# Patient Record
Sex: MALE | Race: WHITE | NOT HISPANIC OR LATINO | Employment: UNEMPLOYED | URBAN - METROPOLITAN AREA
[De-identification: names, ages, dates, MRNs, and addresses within clinical notes are randomized per-mention and may not be internally consistent; named-entity substitution may affect disease eponyms.]

---

## 2017-03-27 ENCOUNTER — OFFICE VISIT (OUTPATIENT)
Dept: FAMILY MEDICINE CLINIC | Facility: CLINIC | Age: 27
End: 2017-03-27

## 2017-03-27 VITALS
BODY MASS INDEX: 36.43 KG/M2 | RESPIRATION RATE: 18 BRPM | HEART RATE: 76 BPM | WEIGHT: 246 LBS | DIASTOLIC BLOOD PRESSURE: 82 MMHG | HEIGHT: 69 IN | SYSTOLIC BLOOD PRESSURE: 128 MMHG | TEMPERATURE: 97 F

## 2017-03-27 DIAGNOSIS — G89.29 CHRONIC BILATERAL LOW BACK PAIN WITH BILATERAL SCIATICA: ICD-10-CM

## 2017-03-27 DIAGNOSIS — Z72.0 TOBACCO ABUSE: ICD-10-CM

## 2017-03-27 DIAGNOSIS — M54.42 CHRONIC BILATERAL LOW BACK PAIN WITH BILATERAL SCIATICA: ICD-10-CM

## 2017-03-27 DIAGNOSIS — M54.41 CHRONIC BILATERAL LOW BACK PAIN WITH BILATERAL SCIATICA: ICD-10-CM

## 2017-03-27 DIAGNOSIS — J45.20 MILD INTERMITTENT ASTHMA WITHOUT COMPLICATION: ICD-10-CM

## 2017-03-27 DIAGNOSIS — Z76.89 ENCOUNTER TO ESTABLISH CARE: Primary | ICD-10-CM

## 2017-03-27 PROCEDURE — 99407 BEHAV CHNG SMOKING > 10 MIN: CPT | Performed by: PHYSICIAN ASSISTANT

## 2017-03-27 PROCEDURE — 99203 OFFICE O/P NEW LOW 30 MIN: CPT | Performed by: PHYSICIAN ASSISTANT

## 2017-03-27 RX ORDER — ALBUTEROL SULFATE 90 UG/1
2 AEROSOL, METERED RESPIRATORY (INHALATION) EVERY 4 HOURS PRN
Qty: 1 INHALER | Refills: 0 | Status: SHIPPED | OUTPATIENT
Start: 2017-03-27 | End: 2018-06-14

## 2017-03-27 RX ORDER — MELOXICAM 15 MG/1
15 TABLET ORAL DAILY
Qty: 30 TABLET | Refills: 5 | Status: SHIPPED | OUTPATIENT
Start: 2017-03-27 | End: 2018-06-14

## 2017-03-27 RX ORDER — GABAPENTIN 300 MG/1
CAPSULE ORAL
Refills: 3 | COMMUNITY
Start: 2017-02-20 | End: 2018-06-14

## 2017-03-27 RX ORDER — IBUPROFEN 200 MG
200 TABLET ORAL EVERY 6 HOURS PRN
COMMUNITY
End: 2017-03-27

## 2017-03-27 RX ORDER — VARENICLINE TARTRATE 1 MG/1
1 TABLET, FILM COATED ORAL 2 TIMES DAILY
Qty: 60 TABLET | Refills: 2 | Status: SHIPPED | OUTPATIENT
Start: 2017-03-27 | End: 2018-06-14
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
## 2018-06-14 ENCOUNTER — OFFICE VISIT (OUTPATIENT)
Dept: FAMILY MEDICINE CLINIC | Facility: CLINIC | Age: 28
End: 2018-06-14

## 2018-06-14 VITALS
RESPIRATION RATE: 18 BRPM | DIASTOLIC BLOOD PRESSURE: 82 MMHG | HEART RATE: 76 BPM | HEIGHT: 69 IN | WEIGHT: 252.5 LBS | BODY MASS INDEX: 37.4 KG/M2 | TEMPERATURE: 99.1 F | SYSTOLIC BLOOD PRESSURE: 124 MMHG

## 2018-06-14 DIAGNOSIS — S39.92XD INJURY OF LOW BACK, SUBSEQUENT ENCOUNTER: Primary | ICD-10-CM

## 2018-06-14 DIAGNOSIS — M21.372 BILATERAL FOOT-DROP: ICD-10-CM

## 2018-06-14 DIAGNOSIS — G89.29 CHRONIC BILATERAL LOW BACK PAIN WITH BILATERAL SCIATICA: ICD-10-CM

## 2018-06-14 DIAGNOSIS — B17.10 ACUTE HEPATITIS C VIRUS INFECTION WITHOUT HEPATIC COMA: ICD-10-CM

## 2018-06-14 DIAGNOSIS — M21.371 BILATERAL FOOT-DROP: ICD-10-CM

## 2018-06-14 DIAGNOSIS — T84.84XA PAINFUL ORTHOPAEDIC HARDWARE (HCC): ICD-10-CM

## 2018-06-14 DIAGNOSIS — Z13.220 SCREENING CHOLESTEROL LEVEL: ICD-10-CM

## 2018-06-14 DIAGNOSIS — S32.009K LUMBAR PSEUDOARTHROSIS: ICD-10-CM

## 2018-06-14 DIAGNOSIS — M54.41 CHRONIC BILATERAL LOW BACK PAIN WITH BILATERAL SCIATICA: ICD-10-CM

## 2018-06-14 DIAGNOSIS — M54.42 CHRONIC BILATERAL LOW BACK PAIN WITH BILATERAL SCIATICA: ICD-10-CM

## 2018-06-14 PROBLEM — S39.92XA LOWER BACK INJURY: Status: ACTIVE | Noted: 2018-06-14

## 2018-06-14 PROCEDURE — 99214 OFFICE O/P EST MOD 30 MIN: CPT | Performed by: PHYSICIAN ASSISTANT

## 2018-06-14 RX ORDER — OXYCODONE HYDROCHLORIDE AND ACETAMINOPHEN 5; 325 MG/1; MG/1
1 TABLET ORAL EVERY 4 HOURS PRN
Qty: 18 TABLET | Refills: 0 | Status: SHIPPED | OUTPATIENT
Start: 2018-06-14 | End: 2018-06-25 | Stop reason: SDUPTHER

## 2018-06-14 RX ORDER — CYCLOBENZAPRINE HCL 10 MG
10 TABLET ORAL 3 TIMES DAILY PRN
Qty: 90 TABLET | Refills: 1 | Status: SHIPPED | OUTPATIENT
Start: 2018-06-14 | End: 2018-10-17 | Stop reason: HOSPADM
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
## 2018-06-18 LAB
ALBUMIN SERPL-MCNC: 4.64 G/DL (ref 3.2–4.8)
ALBUMIN/GLOB SERPL: 1.6 G/DL (ref 1.5–2.5)
ALP SERPL-CCNC: 101 U/L (ref 25–100)
ALT SERPL-CCNC: 52 U/L (ref 7–40)
AST SERPL-CCNC: 35 U/L (ref 0–33)
BASOPHILS # BLD AUTO: 0.02 10*3/MM3 (ref 0–0.2)
BASOPHILS NFR BLD AUTO: 0.3 % (ref 0–1)
BILIRUB SERPL-MCNC: 0.7 MG/DL (ref 0.3–1.2)
BUN SERPL-MCNC: 16 MG/DL (ref 9–23)
BUN/CREAT SERPL: 22.5 (ref 7–25)
CALCIUM SERPL-MCNC: 9.6 MG/DL (ref 8.7–10.4)
CHLORIDE SERPL-SCNC: 103 MMOL/L (ref 99–109)
CHOLEST SERPL-MCNC: 184 MG/DL (ref 0–200)
CO2 SERPL-SCNC: 28 MMOL/L (ref 20–31)
CREAT SERPL-MCNC: 0.71 MG/DL (ref 0.6–1.3)
EOSINOPHIL # BLD AUTO: 0.33 10*3/MM3 (ref 0–0.3)
EOSINOPHIL NFR BLD AUTO: 5.1 % (ref 0–3)
ERYTHROCYTE [DISTWIDTH] IN BLOOD BY AUTOMATED COUNT: 14.4 % (ref 11.3–14.5)
GFR SERPLBLD CREATININE-BSD FMLA CKD-EPI: 131 ML/MIN/1.73
GFR SERPLBLD CREATININE-BSD FMLA CKD-EPI: >150 ML/MIN/1.73
GLOBULIN SER CALC-MCNC: 3 GM/DL
GLUCOSE SERPL-MCNC: 88 MG/DL (ref 70–100)
HCT VFR BLD AUTO: 43.4 % (ref 38.9–50.9)
HCV RNA SERPL NAA+PROBE-ACNC: 560 IU/ML
HCV RNA SERPL NAA+PROBE-LOG IU: 2.75 LOG10 IU/ML
HDLC SERPL-MCNC: 53 MG/DL (ref 40–60)
HGB BLD-MCNC: 14.3 G/DL (ref 13.1–17.5)
HIV 1+2 AB+HIV1 P24 AG SERPL QL IA: NON REACTIVE
IMM GRANULOCYTES # BLD: 0.01 10*3/MM3 (ref 0–0.03)
IMM GRANULOCYTES NFR BLD: 0.2 % (ref 0–0.6)
LDLC SERPL CALC-MCNC: 117 MG/DL (ref 0–100)
LYMPHOCYTES # BLD AUTO: 1.59 10*3/MM3 (ref 0.6–4.8)
LYMPHOCYTES NFR BLD AUTO: 24.4 % (ref 24–44)
MCH RBC QN AUTO: 29.1 PG (ref 27–31)
MCHC RBC AUTO-ENTMCNC: 32.9 G/DL (ref 32–36)
MCV RBC AUTO: 88.2 FL (ref 80–99)
MONOCYTES # BLD AUTO: 0.32 10*3/MM3 (ref 0–1)
MONOCYTES NFR BLD AUTO: 4.9 % (ref 0–12)
NEUTROPHILS # BLD AUTO: 4.25 10*3/MM3 (ref 1.5–8.3)
NEUTROPHILS NFR BLD AUTO: 65.1 % (ref 41–71)
PLATELET # BLD AUTO: 218 10*3/MM3 (ref 150–450)
POTASSIUM SERPL-SCNC: 4.9 MMOL/L (ref 3.5–5.5)
PROT SERPL-MCNC: 7.6 G/DL (ref 5.7–8.2)
RBC # BLD AUTO: 4.92 10*6/MM3 (ref 4.2–5.76)
SODIUM SERPL-SCNC: 141 MMOL/L (ref 132–146)
TEST INFORMATION: NORMAL
TRIGL SERPL-MCNC: 71 MG/DL (ref 0–150)
VLDLC SERPL CALC-MCNC: 14.2 MG/DL
WBC # BLD AUTO: 6.52 10*3/MM3 (ref 3.5–10.8)

## 2018-06-25 ENCOUNTER — OFFICE VISIT (OUTPATIENT)
Dept: FAMILY MEDICINE CLINIC | Facility: CLINIC | Age: 28
End: 2018-06-25

## 2018-06-25 VITALS
BODY MASS INDEX: 36.29 KG/M2 | HEART RATE: 76 BPM | TEMPERATURE: 97.6 F | SYSTOLIC BLOOD PRESSURE: 132 MMHG | RESPIRATION RATE: 18 BRPM | DIASTOLIC BLOOD PRESSURE: 88 MMHG | HEIGHT: 69 IN | WEIGHT: 245 LBS

## 2018-06-25 DIAGNOSIS — G89.29 CHRONIC BILATERAL LOW BACK PAIN WITH BILATERAL SCIATICA: ICD-10-CM

## 2018-06-25 DIAGNOSIS — T84.84XA PAINFUL ORTHOPAEDIC HARDWARE (HCC): Primary | ICD-10-CM

## 2018-06-25 DIAGNOSIS — M54.42 CHRONIC BILATERAL LOW BACK PAIN WITH BILATERAL SCIATICA: ICD-10-CM

## 2018-06-25 DIAGNOSIS — M54.41 CHRONIC BILATERAL LOW BACK PAIN WITH BILATERAL SCIATICA: ICD-10-CM

## 2018-06-25 DIAGNOSIS — S39.92XD INJURY OF LOW BACK, SUBSEQUENT ENCOUNTER: ICD-10-CM

## 2018-06-25 PROCEDURE — 99213 OFFICE O/P EST LOW 20 MIN: CPT | Performed by: PHYSICIAN ASSISTANT

## 2018-06-25 RX ORDER — OXYCODONE HYDROCHLORIDE AND ACETAMINOPHEN 5; 325 MG/1; MG/1
1 TABLET ORAL EVERY 4 HOURS PRN
Qty: 18 TABLET | Refills: 0 | Status: SHIPPED | OUTPATIENT
Start: 2018-06-25 | End: 2018-07-10 | Stop reason: SDUPTHER

## 2018-06-25 NOTE — PROGRESS NOTES
Subjective   Sagar Iniguez is a 28 y.o. male.     History of Present Illness   F/U low back pain. Has been to see ortho at . Running a lot of tests now. Anticipating extensive back surgery. In a lot of pain now. Took last dose of pain medication today. This has really helped. Unfortunately ortho not able to give pain medication until after surgery, which is still unscheduled at this time pending tests. Would like a pain management referral. Pt is anxious to get surgery and rehab completed.     The following portions of the patient's history were reviewed and updated as appropriate: allergies, current medications, past family history, past medical history, past social history, past surgical history and problem list.    Review of Systems   Constitutional: Negative.  Negative for chills, diaphoresis, fatigue and fever.   HENT: Negative.  Negative for congestion, ear discharge, ear pain, hearing loss, nosebleeds, postnasal drip, sinus pressure, sneezing and sore throat.    Eyes: Negative.    Respiratory: Negative.  Negative for cough, chest tightness, shortness of breath and wheezing.    Cardiovascular: Negative.  Negative for chest pain, palpitations and leg swelling.   Gastrointestinal: Negative for abdominal distention, abdominal pain, anal bleeding, blood in stool, constipation, diarrhea, nausea, rectal pain and vomiting.   Genitourinary: Negative.  Negative for difficulty urinating, dysuria, flank pain, frequency, hematuria and urgency.   Musculoskeletal: Positive for back pain and gait problem. Negative for arthralgias, joint swelling, myalgias, neck pain and neck stiffness.   Skin: Negative.  Negative for color change, pallor, rash and wound.   Allergic/Immunologic: Negative.  Negative for immunocompromised state.   Neurological: Negative for dizziness, syncope, weakness, light-headedness, numbness and headaches.   Hematological: Negative.  Negative for adenopathy. Does not bruise/bleed easily.       Objective   "  Blood pressure 132/88, pulse 76, temperature 97.6 °F (36.4 °C), resp. rate 18, height 175.3 cm (69\"), weight 111 kg (245 lb).     Physical Exam   Constitutional: He is oriented to person, place, and time. He appears well-developed and well-nourished.   HENT:   Head: Normocephalic and atraumatic.   Right Ear: External ear normal.   Left Ear: External ear normal.   Nose: Nose normal.   Mouth/Throat: Oropharynx is clear and moist. No oropharyngeal exudate.   Eyes: Conjunctivae and EOM are normal. Pupils are equal, round, and reactive to light.   Neck: Normal range of motion. Neck supple. No tracheal deviation present. No thyromegaly present.   Cardiovascular: Normal rate, regular rhythm, normal heart sounds and intact distal pulses.  Exam reveals no gallop and no friction rub.    No murmur heard.  Pulmonary/Chest: Effort normal and breath sounds normal. No respiratory distress. He has no wheezes. He has no rales. He exhibits no tenderness.   Musculoskeletal:   Ambulating with cane, antalgic gait    Lymphadenopathy:     He has no cervical adenopathy.   Neurological: He is alert and oriented to person, place, and time. He has normal reflexes.   Skin: Skin is warm and dry.   Psychiatric: He has a normal mood and affect. His behavior is normal. Judgment and thought content normal.   Nursing note and vitals reviewed.      Assessment/Plan   Sagar was seen today for referral to pain management.    Diagnoses and all orders for this visit:    Painful orthopaedic hardware  -     Ambulatory Referral to Pain Management  -     oxyCODONE-acetaminophen (PERCOCET) 5-325 MG per tablet; Take 1 tablet by mouth Every 4 (Four) Hours As Needed for Severe Pain .    Injury of low back, subsequent encounter  -     Ambulatory Referral to Pain Management  -     oxyCODONE-acetaminophen (PERCOCET) 5-325 MG per tablet; Take 1 tablet by mouth Every 4 (Four) Hours As Needed for Severe Pain .    Chronic bilateral low back pain with bilateral " sciatica  -     Ambulatory Referral to Pain Management    will refer to pain management. Pt aware of agreement he will need to sign. Temporary pain management today. Will keep close f.u pending ortho evaluation of back and impending surgery

## 2018-07-09 ENCOUNTER — TELEPHONE (OUTPATIENT)
Dept: FAMILY MEDICINE CLINIC | Facility: CLINIC | Age: 28
End: 2018-07-09

## 2018-07-09 DIAGNOSIS — S39.92XD INJURY OF LOW BACK, SUBSEQUENT ENCOUNTER: ICD-10-CM

## 2018-07-09 DIAGNOSIS — T84.84XA PAINFUL ORTHOPAEDIC HARDWARE (HCC): ICD-10-CM

## 2018-07-10 ENCOUNTER — LAB (OUTPATIENT)
Dept: LAB | Facility: HOSPITAL | Age: 28
End: 2018-07-10

## 2018-07-10 ENCOUNTER — OFFICE VISIT (OUTPATIENT)
Dept: GASTROENTEROLOGY | Facility: CLINIC | Age: 28
End: 2018-07-10

## 2018-07-10 VITALS
OXYGEN SATURATION: 98 % | BODY MASS INDEX: 37.77 KG/M2 | TEMPERATURE: 97.7 F | WEIGHT: 255 LBS | SYSTOLIC BLOOD PRESSURE: 168 MMHG | HEIGHT: 69 IN | HEART RATE: 65 BPM | DIASTOLIC BLOOD PRESSURE: 98 MMHG

## 2018-07-10 DIAGNOSIS — B18.2 HEP C W/O COMA, CHRONIC (HCC): Primary | ICD-10-CM

## 2018-07-10 DIAGNOSIS — B18.2 HEP C W/O COMA, CHRONIC (HCC): ICD-10-CM

## 2018-07-10 LAB
HAV IGM SERPL QL IA: NORMAL
HBV SURFACE AB SER RIA-ACNC: REACTIVE
HBV SURFACE AG SERPL QL IA: NORMAL

## 2018-07-10 PROCEDURE — 99244 OFF/OP CNSLTJ NEW/EST MOD 40: CPT | Performed by: NURSE PRACTITIONER

## 2018-07-10 PROCEDURE — 86709 HEPATITIS A IGM ANTIBODY: CPT

## 2018-07-10 PROCEDURE — 87902 NFCT AGT GNTYP ALYS HEP C: CPT

## 2018-07-10 PROCEDURE — 87340 HEPATITIS B SURFACE AG IA: CPT

## 2018-07-10 PROCEDURE — 86706 HEP B SURFACE ANTIBODY: CPT

## 2018-07-10 PROCEDURE — 86708 HEPATITIS A ANTIBODY: CPT

## 2018-07-10 PROCEDURE — 86704 HEP B CORE ANTIBODY TOTAL: CPT

## 2018-07-10 PROCEDURE — 36415 COLL VENOUS BLD VENIPUNCTURE: CPT

## 2018-07-10 RX ORDER — OXYCODONE HYDROCHLORIDE AND ACETAMINOPHEN 5; 325 MG/1; MG/1
1 TABLET ORAL EVERY 4 HOURS PRN
Qty: 18 TABLET | Refills: 0 | Status: SHIPPED | OUTPATIENT
Start: 2018-07-10 | End: 2018-08-10 | Stop reason: HOSPADM
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
## 2018-07-10 NOTE — PATIENT INSTRUCTIONS
Hepatitis C  Hepatitis C is a viral infection of the liver. It can lead to scarring of the liver (cirrhosis), liver failure, or liver cancer. Hepatitis C may go undetected for months or years because people with the infection may not have symptoms, or they may have only mild symptoms.  What are the causes?  This condition is caused by the hepatitis C virus (HCV). The virus can spread from person to person (is contagious) through:  · Blood.  · Childbirth. A woman who has hepatitis C can pass it to her baby during birth.  · Bodily fluids, such as breast milk, tears, semen, vaginal fluids, and saliva.  · Blood transfusions or organ transplants done in the [place] before 1992.    What increases the risk?  The following factors may make you more likely to develop this condition:  · Having contact with unclean (contaminated) needles or syringes. This may result from:  ? Acupuncture.  ? Tattoing.  ? Body piercing.  ? Injecting drugs.  · Having unprotected sex with someone who is infected.  · Needing treatment to filter your blood (kidney dialysis).  · Having HIV (human immunodeficiency virus) or AIDS (acquired immunodeficiency syndrome).  · Working in a job that involves contact with blood or bodily fluids, such as health care.    What are the signs or symptoms?  Symptoms of this condition include:  · Fatigue.  · Loss of appetite.  · Nausea.  · Vomiting.  · Abdominal pain.  · Dark yellow urine.  · Yellowish skin and eyes (jaundice).  · Itchy skin.  · Hayden-colored bowel movements.  · Joint pain.  · Bleeding and bruising easily.  · Fluid building up in your stomach (ascites).    In some cases, you may not have any symptoms.  How is this diagnosed?  This condition is diagnosed with:  · Blood tests.  · Other tests to check how well your liver is functioning. They may include:  ? Magnetic resonance elastography (MRE). This imaging test uses MRIs and sound waves to measure liver stiffness.  ? Transient elastography. This  imaging test uses ultrasounds to measure liver stiffness.  ? Liver biopsy. This test requires taking a small tissue sample from your liver to examine it under a microscope.    How is this treated?  Your health care provider may perform noninvasive tests or a liver biopsy to help decide the best course of treatment. Treatment may include:  · Antiviral medicines and other medicines.  · Follow-up treatments every 6-12 months for infections or other liver conditions.  · Receiving a donated liver (liver transplant).    Follow these instructions at home:  Medicines  · Take over-the-counter and prescription medicines only as told by your health care provider.  · Take your antiviral medicine as told by your health care provider. Do not stop taking the antiviral even if you start to feel better.  · Do not take any medicines unless approved by your health care provider, including over-the-counter medicines and birth control pills.  Activity  · Rest as needed.  · Do not have sex unless approved by your health care provider.  · Ask your health care provider when you may return to school or work.  Eating and drinking  · Eat a balanced diet with plenty of fruits and vegetables, whole grains, and lowfat (lean) meats or non-meat proteins (such as beans or tofu).  · Drink enough fluids to keep your urine clear or pale yellow.  · Do not drink alcohol.  General instructions  · Do not share toothbrushes, nail clippers, or razors.  · Wash your hands frequently with soap and water. If soap and water are not available, use hand .  · Cover any cuts or open sores on your skin to prevent spreading the virus.  · Keep all follow-up visits as told by your health care provider. This is important. You may need follow-up visits every 6-12 months.  How is this prevented?  There is no vaccine for hepatitis C. The only way to prevent the disease is to reduce the risk of exposure to the virus. Make sure you:  · Wash your hands frequently with  soap and water. If soap and water are not available, use hand .  · Do not share needles or syringes.  · Practice safe sex and use condoms.  · Avoid handling blood or bodily fluids without gloves or other protection.  · Avoid getting tattoos or piercings in shops or other locations that are not clean.    Contact a health care provider if:  · You have a fever.  · You develop abdominal pain.  · You pass dark urine.  · You pass lela-colored stools.  · You develop joint pain.  Get help right away if:  · You have increasing fatigue or weakness.  · You lose your appetite.  · You cannot eat or drink without vomiting.  · You develop jaundice or your jaundice gets worse.  · You bruise or bleed easily.  Summary  · Hepatitis C is a viral infection of the liver. It can lead to scarring of the liver (cirrhosis), liver failure, or liver cancer.  · The hepatitis C virus (HCV) causes this condition. The virus can pass from person to person (is contagious).  · You should not take any medicines unless approved by your health care provider. This includes over-the-counter medicines and birth control pills.  This information is not intended to replace advice given to you by your health care provider. Make sure you discuss any questions you have with your health care provider.  Document Released: 12/15/2001 Document Revised: 01/23/2018 Document Reviewed: 01/23/2018  [employer] Patient Education © 2018 [employer]

## 2018-07-11 ENCOUNTER — OFFICE VISIT (OUTPATIENT)
Dept: FAMILY MEDICINE CLINIC | Facility: CLINIC | Age: 28
End: 2018-07-11

## 2018-07-11 VITALS
HEIGHT: 69 IN | WEIGHT: 266 LBS | DIASTOLIC BLOOD PRESSURE: 84 MMHG | SYSTOLIC BLOOD PRESSURE: 138 MMHG | BODY MASS INDEX: 39.4 KG/M2 | TEMPERATURE: 98 F | RESPIRATION RATE: 18 BRPM | HEART RATE: 84 BPM

## 2018-07-11 DIAGNOSIS — T84.84XA PAINFUL ORTHOPAEDIC HARDWARE (HCC): ICD-10-CM

## 2018-07-11 DIAGNOSIS — Z00.01 ENCOUNTER FOR WELL ADULT EXAM WITH ABNORMAL FINDINGS: Primary | ICD-10-CM

## 2018-07-11 DIAGNOSIS — S39.92XD INJURY OF LOW BACK, SUBSEQUENT ENCOUNTER: ICD-10-CM

## 2018-07-11 LAB
HAV AB SER QL IA: NEGATIVE
HBV CORE AB SER DONR QL IA: NEGATIVE

## 2018-07-11 PROCEDURE — 99395 PREV VISIT EST AGE 18-39: CPT | Performed by: PHYSICIAN ASSISTANT

## 2018-07-11 NOTE — PROGRESS NOTES
Chief Complaint   Patient presents with   • Annual Exam       Subjective     The patient is a 28 y.o. male who comes in to the office today for well exam.        Nutrition:  well balanced   Exercise:  walking, fell twice last week   Sleep:      Dentist: Not UTD   Eye Exam: no issues    Stressors: back injury     Other concerns/problems: still with chronic low back pain, made worse by recent injury and breaking of hardware     Past medical history, past surgical history, family history, social history was all reviewed and updated.    HPI  Would like to know about pain management referral     Review of Systems   Constitutional: Negative.  Negative for chills, diaphoresis, fatigue and fever.   HENT: Negative.  Negative for congestion, ear discharge, ear pain, hearing loss, nosebleeds, postnasal drip, sinus pressure, sneezing and sore throat.    Eyes: Negative.    Respiratory: Negative.  Negative for cough, chest tightness, shortness of breath and wheezing.    Cardiovascular: Negative.  Negative for chest pain, palpitations and leg swelling.   Gastrointestinal: Negative for abdominal distention, abdominal pain, anal bleeding, blood in stool, constipation, diarrhea, nausea, rectal pain and vomiting.   Endocrine: Negative.  Negative for cold intolerance, heat intolerance, polydipsia, polyphagia and polyuria.   Genitourinary: Negative.  Negative for difficulty urinating, dysuria, flank pain, frequency, hematuria and urgency.   Musculoskeletal: Positive for arthralgias, back pain and gait problem. Negative for joint swelling, myalgias, neck pain and neck stiffness.   Skin: Negative.  Negative for color change, pallor, rash and wound.   Allergic/Immunologic: Negative.  Negative for immunocompromised state.   Neurological: Negative for dizziness, syncope, weakness, light-headedness, numbness and headaches.   Hematological: Negative.  Negative for adenopathy. Does not bruise/bleed easily.   Psychiatric/Behavioral: Negative.   "Negative for behavioral problems, confusion, self-injury, sleep disturbance and suicidal ideas. The patient is not nervous/anxious.        Objective   /84   Pulse 84   Temp 98 °F (36.7 °C)   Resp 18   Ht 175.3 cm (69.02\")   Wt 121 kg (266 lb)   BMI 39.26 kg/m²     Physical Exam   Constitutional: He is oriented to person, place, and time. He appears well-developed and well-nourished.   HENT:   Head: Normocephalic and atraumatic.   Right Ear: External ear normal.   Left Ear: External ear normal.   Nose: Nose normal.   Mouth/Throat: Oropharynx is clear and moist. No oropharyngeal exudate.   Eyes: Conjunctivae and EOM are normal. Pupils are equal, round, and reactive to light.   Neck: Normal range of motion. Neck supple. No tracheal deviation present. No thyromegaly present.   Cardiovascular: Normal rate, regular rhythm, normal heart sounds and intact distal pulses.  Exam reveals no gallop and no friction rub.    No murmur heard.  Pulmonary/Chest: Effort normal and breath sounds normal. No respiratory distress. He has no wheezes. He has no rales. He exhibits no tenderness.   Abdominal: Soft. Bowel sounds are normal. He exhibits no distension and no mass. There is no tenderness. There is no rebound and no guarding. No hernia.   Musculoskeletal:   Ambulating with cane, antalgic gait    Lymphadenopathy:     He has no cervical adenopathy.   Neurological: He is alert and oriented to person, place, and time. He has normal reflexes.   Skin: Skin is warm and dry.   Psychiatric: He has a normal mood and affect. His behavior is normal. Judgment and thought content normal.   Nursing note and vitals reviewed.      Assessment/Plan     1. Encounter for well adult exam with abnormal findings    2. Injury of low back, subsequent encounter    3. Painful orthopaedic hardware (CMS/HCC)      NIKKI Parish  "

## 2018-07-13 LAB
HCV GENTYP SERPL NAA+PROBE: 3
Lab: NORMAL

## 2018-07-18 ENCOUNTER — APPOINTMENT (OUTPATIENT)
Dept: ULTRASOUND IMAGING | Facility: HOSPITAL | Age: 28
End: 2018-07-18

## 2018-08-01 ENCOUNTER — HOSPITAL ENCOUNTER (OUTPATIENT)
Dept: ULTRASOUND IMAGING | Facility: HOSPITAL | Age: 28
Discharge: HOME | End: 2018-08-01
Admitting: NURSE PRACTITIONER

## 2018-08-01 DIAGNOSIS — B18.2 HEP C W/O COMA, CHRONIC (HCC): ICD-10-CM

## 2018-08-01 PROCEDURE — 76705 ECHO EXAM OF ABDOMEN: CPT

## 2018-08-10 ENCOUNTER — LAB (OUTPATIENT)
Dept: LAB | Facility: HOSPITAL | Age: 28
End: 2018-08-10

## 2018-08-10 ENCOUNTER — OFFICE VISIT (OUTPATIENT)
Dept: GASTROENTEROLOGY | Facility: CLINIC | Age: 28
End: 2018-08-10

## 2018-08-10 VITALS
HEIGHT: 69 IN | TEMPERATURE: 98.2 F | BODY MASS INDEX: 40.46 KG/M2 | WEIGHT: 273.2 LBS | OXYGEN SATURATION: 96 % | HEART RATE: 95 BPM | DIASTOLIC BLOOD PRESSURE: 80 MMHG | SYSTOLIC BLOOD PRESSURE: 122 MMHG

## 2018-08-10 DIAGNOSIS — E66.9 OBESITY, CLASS II, BMI 35-39.9: ICD-10-CM

## 2018-08-10 DIAGNOSIS — B18.2 HEP C W/O COMA, CHRONIC (HCC): ICD-10-CM

## 2018-08-10 DIAGNOSIS — R74.8 LIVER ENZYME ELEVATION: ICD-10-CM

## 2018-08-10 DIAGNOSIS — Z23 NEED FOR VACCINATION AGAINST HEPATITIS A: ICD-10-CM

## 2018-08-10 DIAGNOSIS — B18.2 HEP C W/O COMA, CHRONIC (HCC): Primary | ICD-10-CM

## 2018-08-10 LAB
AMPHET+METHAMPHET UR QL: NEGATIVE
AMPHETAMINES UR QL: NEGATIVE
BARBITURATES UR QL SCN: NEGATIVE
BENZODIAZ UR QL SCN: NEGATIVE
BUPRENORPHINE SERPL-MCNC: NEGATIVE NG/ML
CANNABINOIDS SERPL QL: NEGATIVE
COCAINE UR QL: NEGATIVE
METHADONE UR QL SCN: NEGATIVE
OPIATES UR QL: POSITIVE
OXYCODONE UR QL SCN: NEGATIVE
PCP UR QL SCN: NEGATIVE
PROPOXYPH UR QL: NEGATIVE
TRICYCLICS UR QL SCN: NEGATIVE

## 2018-08-10 PROCEDURE — 80306 DRUG TEST PRSMV INSTRMNT: CPT

## 2018-08-10 PROCEDURE — 99213 OFFICE O/P EST LOW 20 MIN: CPT | Performed by: NURSE PRACTITIONER

## 2018-08-10 PROCEDURE — 90471 IMMUNIZATION ADMIN: CPT | Performed by: NURSE PRACTITIONER

## 2018-08-10 PROCEDURE — 90632 HEPA VACCINE ADULT IM: CPT | Performed by: NURSE PRACTITIONER

## 2018-08-10 RX ORDER — MORPHINE SULFATE AND NALTREXONE HYDROCHLORIDE 30; 1.2 MG/1; MG/1
1.2 CAPSULE, EXTENDED RELEASE ORAL ONCE
Refills: 0 | COMMUNITY
Start: 2018-08-08 | End: 2018-10-17 | Stop reason: HOSPADM

## 2018-08-10 NOTE — PROGRESS NOTES
GASTROENTEROLOGY OUTPATIENT ESTABLISHED PATIENT NOTE  Patient: ILSA VERAS : 1990  Date of Service: 08/10/2018  CC: Follow-up (1 month; ultersound results)    Assessment/Plan                                             ASSESSMENT & PLANS     Hep C w/o coma, chronic. Genotype 3.  No Fibrosis. Metavir score of F0   Liver enzyme elevation r/t problem # 1  -     Urine Drug Screen - Urine, Clean Catch; Future  · Pending insurance approval, start Mavyret (Glecaprevir 100 mg and pibrentasvir 40 mg) tab.  Take 3 tabs once daily with food x 8 wks.  Other choices for Hep C genotype 3: Epclusa, Daklinza w/ Sovaldi x 12 wks    Need for vaccination against hepatitis A  -     Hepatitis A Vaccine Adult IM. first dose today    RTC before starting Hep C meds        DISCUSSION: I explained to patient that pt has no liver fibrosis at this, which is very good for pt. However, insurance companies often decline paying for hepatitis C treatment of patients with no or minimal fibrosis.  I encouraged patient to continues remain free of any recreational drug.   I encouraged pt to stay healthy, be active, stop smoking (if applicable), avoid doing anything that can worsen his liver condition, such as alcohol, any hepatotoxic medications (i.e Tylenol). I told patient that we will reassess in 6 months to see if there is any worsening fibrosis. Patient is instructed to notify me if patient develops jaundice, profound fatigue, weight gain, altered mental status, frequent bruising and/or bleeding.The above plan was delineated in details with patient and all questions and concerns were answered.  Patient is also given contact information.  Patient is to return as scheduled or sooner if new problems arise.   Subjective                                                     SUBJECTIVE   History of Present Illness  Mr. Ilsa Veras is a 28 y.o. male is here for Follow-up on recent evaluation for hepatitis C presumeably from a haircut while  incarcerated.   Pt denies any abdominal pain, including RUQ abdominal pain.  Pt denies jaundice, pruritus, stool or urine color changes, palmar erythema, or any skin changes. No stigmata of liver disease.  History of lumbar fracture in 2009 status post back surgeries.  Continues to walk with a cane. Takes Embeda for pain. Currently seeing [facility]    ROS:Review of Systems   Constitutional: Negative.    Eyes: Negative.    Respiratory: Negative.    Cardiovascular: Negative.    Gastrointestinal: Negative.    Endocrine: Negative.    Genitourinary: Negative.    Musculoskeletal: Negative.    Skin: Negative.    Allergic/Immunologic: Negative.    Neurological: Positive for dizziness (may be medication related to the Embeda) and headaches (may be medication related to the embeda).   Hematological: Negative.    Psychiatric/Behavioral: Negative.    Subjective   PAST MED HX: Pt  has no past medical history on file.  PAST SURG HX: Pt  has no past surgical history on file.  FAM HX: family history includes Diabetes in his maternal grandmother and mother; Heart disease in his maternal grandmother; Lumbar disc disease in his mother.  SOC HX: Pt  reports that he has quit smoking. He started smoking about 12 years ago. He has a 11.00 pack-year smoking history. He has quit using smokeless tobacco. He reports that he drinks alcohol.    Objective                                                           OBJECTIVE   Allergy: Pt is allergic to hydrocodone.  MEDS: •  cyclobenzaprine (FLEXERIL) 10 MG tablet, Take 1 tablet by mouth 3 (Three) Times a Day As Needed for Muscle Spasms., Disp: 90 tablet, Rfl: 1  •  EMBEDA 30-1.2 MG capsule controlled-release, Take 1.2 mg by mouth 1 (One) Time., Disp: , Rfl: 0  Lab Results   Component Value Date    EOSABS 0.33 (H) 06/14/2018    HGB 14.3 06/14/2018    HCT 43.4 06/14/2018    MCV 88.2 06/14/2018    MCHC 32.9 06/14/2018     06/14/2018     Lab Results   Component  Value Date     06/14/2018    K 4.9 06/14/2018     06/14/2018    CO2 28.0 06/14/2018    BUN 16 06/14/2018    CREATININE 0.71 06/14/2018    CALCIUM 9.6 06/14/2018      Lab Results   Component Value Date    HEPAIGM Non-Reactive 07/10/2018    HEPBSAG Non-Reactive 07/10/2018      Hepatitis C Infection Results     HepC Genotype, Viral Load  Lab Results   Component Value Date    HCVGENOTYPE 3 07/10/2018    HEPATITISC 560 06/14/2018    REBMEY72 2.748 06/14/2018     Lab Results   Component Value Date    AST 35 (H) 06/14/2018     Lab Results   Component Value Date    ALT 52 (H) 06/14/2018     Lab Results   Component Value Date    ALKPHOS 101 (H) 06/14/2018     Lab Results   Component Value Date    BILITOT 0.7 06/14/2018    ALBUMIN 4.64 06/14/2018     HIV  Lab Results   Component Value Date    RLP5DMF0 Non Reactive 06/14/2018     HepB Surface Antigen  Lab Results   Component Value Date    HEPBSAG Non-Reactive 07/10/2018     Immunity Against Hep A and B  Lab Results   Component Value Date    HAV Negative 07/10/2018    HEPBSAB Reactive (A) 07/10/2018     Urine Drug Screen  Lab Results   Component Value Date    THCURSCR Negative 08/10/2018    PCPUR Negative 08/10/2018    COCAINEUR Negative 08/10/2018    METAMPSCNUR Negative 08/10/2018    LABOPIASCN Positive (A) 08/10/2018    AMPHETSCREEN Negative 08/10/2018    LABBENZSCN Negative 08/10/2018    TRICYCLICSCN Negative 08/10/2018    LABMETHSCN Negative 08/10/2018    BARBITSCNUR Negative 08/10/2018    OXYCODONESCN Negative 08/10/2018    PROPOXSCN Negative 08/10/2018    BUPRENORSCNU Negative 08/10/2018       EXAMINATION: US LIVER-   FINDINGS: The pancreas is homogeneous in its visualized portions with no  focal mass or abnormal fluid collection. The liver is homogeneous and  normal in echotexture and echogenicity with no evidence of focal mass or  intrahepatic biliary ductal dilatation. Gallbladder reveals no evidence  of stones, gallbladder wall thickening or  pericholecystic fluid. The  common bile duct measures 5 mm. The right kidney is normal in size,  configuration, and texture measuring in length from pole to pole 11.1  cm. There is no solid cortical mass or renal cortical cyst seen within  the right kidney. No hydronephrosis or nephrolithiasis.     Patient's BMI is 39.3. Elastography was performed with a shear wave  velocity of 1.03 m/s. This correlates to a Metavir score of  F0     IMPRESSION:  1.Elastography was performed with a shear wave velocity of 1.03 m/s.  This correlates to a Metavir score of  F0  2. Unremarkable right upper quadrant ultrasound.      Wt Readings from Last 5 Encounters:   08/10/18 124 kg (273 lb 3.2 oz)   07/11/18 121 kg (266 lb)   07/10/18 116 kg (255 lb)   06/25/18 111 kg (245 lb)   06/14/18 115 kg (252 lb 8 oz)   body mass index is 40.32 kg/m².,Temp: 98.2 °F (36.8 °C),BP: 122/80,Heart Rate: 95   Physical Exam  General Well developed; well nourished; no acute distress.   ENT Oral mucosa pink and moist without thrush or lesions.    GI Abd soft, NT, ND, normal active bowel sounds.  No HSM.  No abd hernia    Pt care team: Jacy SAMUEL & Morena Mcgill  [facility]--Gastroenterology  [phone]    CC: [place] Longoria PA   [address] [place] [address] / [place] [address] FAX:[phone]

## 2018-09-07 ENCOUNTER — TELEPHONE (OUTPATIENT)
Dept: GASTROENTEROLOGY | Facility: CLINIC | Age: 28
End: 2018-09-07

## 2018-09-07 NOTE — TELEPHONE ENCOUNTER
"Pt is counseled on side effects of Mavyret.   Pt reports that he is only Oxycodone and Vitamin D currently.  No other meds, including OTC  Reports of frequent gum bleed.  Has not seen a dentist for over 12 yrs.  Last saw dentist when he was 15 y/o  Pt is counseled on seeing a dentist prior to starting Hep C med  Pt states that he has to find a dentist who would accept his insurance. He wants to go ahead and start his HepC meds.  I explained to him risks and benefits of starting Hep C med at this time.    Pt states of planning to get \"bunches of tooth brushes and throw them out when he has a gum bleed.\" He is aware risks and benefits explained.     Pt is to RTC in 1 month for a f/u        "

## 2018-09-07 NOTE — TELEPHONE ENCOUNTER
Jacy,  Patient called back. He stated he received his HEP C medication today. He doesn't see why he has to come back in for education on how to take it. He had papers to come with medication on how to take medication.  He agreed to come back in a month. Please advise.

## 2018-10-17 ENCOUNTER — OFFICE VISIT (OUTPATIENT)
Dept: GASTROENTEROLOGY | Facility: CLINIC | Age: 28
End: 2018-10-17

## 2018-10-17 ENCOUNTER — LAB (OUTPATIENT)
Dept: LAB | Facility: HOSPITAL | Age: 28
End: 2018-10-17

## 2018-10-17 VITALS
RESPIRATION RATE: 16 BRPM | SYSTOLIC BLOOD PRESSURE: 140 MMHG | WEIGHT: 278 LBS | DIASTOLIC BLOOD PRESSURE: 100 MMHG | BODY MASS INDEX: 41.18 KG/M2 | OXYGEN SATURATION: 97 % | TEMPERATURE: 99.3 F | HEART RATE: 103 BPM | HEIGHT: 69 IN

## 2018-10-17 DIAGNOSIS — B18.2 HEP C W/O COMA, CHRONIC (HCC): ICD-10-CM

## 2018-10-17 DIAGNOSIS — B18.2 HEP C W/O COMA, CHRONIC (HCC): Primary | ICD-10-CM

## 2018-10-17 DIAGNOSIS — Z23 NEED FOR VACCINATION AGAINST HEPATITIS A: ICD-10-CM

## 2018-10-17 LAB
ALBUMIN SERPL-MCNC: 4.62 G/DL (ref 3.2–4.8)
ALBUMIN/GLOB SERPL: 1.9 G/DL (ref 1.5–2.5)
ALP SERPL-CCNC: 112 U/L (ref 25–100)
ALT SERPL W P-5'-P-CCNC: 36 U/L (ref 7–40)
ANION GAP SERPL CALCULATED.3IONS-SCNC: 7 MMOL/L (ref 3–11)
AST SERPL-CCNC: 26 U/L (ref 0–33)
BASOPHILS # BLD AUTO: 0.03 10*3/MM3 (ref 0–0.2)
BASOPHILS NFR BLD AUTO: 0.3 % (ref 0–1)
BILIRUB SERPL-MCNC: 0.7 MG/DL (ref 0.3–1.2)
BUN BLD-MCNC: 13 MG/DL (ref 9–23)
BUN/CREAT SERPL: 19.4 (ref 7–25)
CALCIUM SPEC-SCNC: 9.4 MG/DL (ref 8.7–10.4)
CHLORIDE SERPL-SCNC: 98 MMOL/L (ref 99–109)
CO2 SERPL-SCNC: 27 MMOL/L (ref 20–31)
CREAT BLD-MCNC: 0.67 MG/DL (ref 0.6–1.3)
DEPRECATED RDW RBC AUTO: 44.7 FL (ref 37–54)
EOSINOPHIL # BLD AUTO: 0.42 10*3/MM3 (ref 0–0.3)
EOSINOPHIL NFR BLD AUTO: 4.6 % (ref 0–3)
ERYTHROCYTE [DISTWIDTH] IN BLOOD BY AUTOMATED COUNT: 14.3 % (ref 11.3–14.5)
GFR SERPL CREATININE-BSD FRML MDRD: 140 ML/MIN/1.73
GLOBULIN UR ELPH-MCNC: 2.5 GM/DL
GLUCOSE BLD-MCNC: 94 MG/DL (ref 70–100)
HCT VFR BLD AUTO: 41.5 % (ref 38.9–50.9)
HGB BLD-MCNC: 14.4 G/DL (ref 13.1–17.5)
IMM GRANULOCYTES # BLD: 0.04 10*3/MM3 (ref 0–0.03)
IMM GRANULOCYTES NFR BLD: 0.4 % (ref 0–0.6)
LYMPHOCYTES # BLD AUTO: 2.63 10*3/MM3 (ref 0.6–4.8)
LYMPHOCYTES NFR BLD AUTO: 28.8 % (ref 24–44)
MCH RBC QN AUTO: 29.9 PG (ref 27–31)
MCHC RBC AUTO-ENTMCNC: 34.7 G/DL (ref 32–36)
MCV RBC AUTO: 86.3 FL (ref 80–99)
MONOCYTES # BLD AUTO: 0.49 10*3/MM3 (ref 0–1)
MONOCYTES NFR BLD AUTO: 5.4 % (ref 0–12)
NEUTROPHILS # BLD AUTO: 5.56 10*3/MM3 (ref 1.5–8.3)
NEUTROPHILS NFR BLD AUTO: 60.9 % (ref 41–71)
PLATELET # BLD AUTO: 263 10*3/MM3 (ref 150–450)
PMV BLD AUTO: 9.1 FL (ref 6–12)
POTASSIUM BLD-SCNC: 4 MMOL/L (ref 3.5–5.5)
PROT SERPL-MCNC: 7.1 G/DL (ref 5.7–8.2)
RBC # BLD AUTO: 4.81 10*6/MM3 (ref 4.2–5.76)
SODIUM BLD-SCNC: 132 MMOL/L (ref 132–146)
WBC NRBC COR # BLD: 9.13 10*3/MM3 (ref 3.5–10.8)

## 2018-10-17 PROCEDURE — 36415 COLL VENOUS BLD VENIPUNCTURE: CPT

## 2018-10-17 PROCEDURE — 99213 OFFICE O/P EST LOW 20 MIN: CPT | Performed by: NURSE PRACTITIONER

## 2018-10-17 PROCEDURE — 80053 COMPREHEN METABOLIC PANEL: CPT

## 2018-10-17 PROCEDURE — 87522 HEPATITIS C REVRS TRNSCRPJ: CPT

## 2018-10-17 PROCEDURE — 85025 COMPLETE CBC W/AUTO DIFF WBC: CPT

## 2018-10-17 RX ORDER — OXYCODONE AND ACETAMINOPHEN 10; 325 MG/1; MG/1
1 TABLET ORAL EVERY 6 HOURS PRN
COMMUNITY
End: 2019-02-01

## 2018-10-17 NOTE — PROGRESS NOTES
GASTROENTEROLOGY OUTPATIENT ESTABLISHED PATIENT NOTE  Patient: ILSA VERAS : 1990  Date of Service: 10/17/2018  CC: Hepatitis C    Assessment/Plan                                             ASSESSMENT & PLANS     Hep C w/o coma, chronic (CMS/HCC) Genotype 3.  No Fibrosis. Metavir score of F0   -     CBC Auto Differential; Future  -     Comprehensive Metabolic Panel; Future  -     Hepatitis C RNA, Quantitative, PCR (graph); Future  · Continue Mavyret to complete 8-wks course    Need for vaccination against hepatitis A  · Pt is due for last Hep A vaccine in 2019    Follow Up: RTC in 2019      DISCUSSION: The above plan was delineated in details with patient  and all questions and concerns were answered.  Patient is also given contact information.  Patient is to return as scheduled or sooner if new problems arise.   Subjective                                                     SUBJECTIVE   History of Present Illness  Mr. Ilsa Veras is a 28 y.o. male who is here for Hepatitis C. Presumably contracted Hep C from a haircut while incarcerated.  No hx of recreational drug use.   Started Mavyret 18.  Has 16 days of Mavyret remaining. Has some nausea w/ Mavyret  No longer has frequent gum bleed w/ changing toothpaste    Pt denies any abdominal pain, including RUQ abdominal pain.  Pt denies jaundice, pruritus, stool or urine color changes, palmar erythema, or any skin changes. No stigmata of liver disease.  No change in bowel habits. Pt denies dark black stools or bright red blood in the stools, in the toilet bowl, or on the toilet tissue.  No diarrhea or constipation.  Stool character and consistency have been normal.    ROS:Review of Systems   Gastrointestinal: Positive for nausea.   Psychiatric/Behavioral: Positive for sleep disturbance (Trouble falling asleep, bad dreams).   All other systems reviewed and are negative.    Objective                                                           OBJECTIVE    Allergy: Pt is allergic to hydrocodone.  MEDS:   Current Outpatient Prescriptions:   •  Glecaprevir-Pibrentasvir 100-40 MG tablet, Take 3 tablets by mouth Daily With Breakfast. Take THREE (3) tabs once daily with food, Disp: 84 tablet, Rfl: 1  •  oxyCODONE-acetaminophen (PERCOCET)  MG per tablet, Take 1 tablet by mouth Every 6 (Six) Hours As Needed for Moderate Pain ., Disp: , Rfl:   Lab Results   Component Value Date    WBCREF 6.52 06/14/2018    EOSABS 0.33 (H) 06/14/2018    HGB 14.3 06/14/2018    HCT 43.4 06/14/2018    MCV 88.2 06/14/2018    MCHC 32.9 06/14/2018     06/14/2018     Lab Results   Component Value Date     06/14/2018    K 4.9 06/14/2018     06/14/2018    CO2 28.0 06/14/2018    BUN 16 06/14/2018    CREATININE 0.71 06/14/2018    EGFRIFNONA 131 06/14/2018    CALCIUM 9.6 06/14/2018     Lab Results   Component Value Date    AST 35 (H) 06/14/2018    ALT 52 (H) 06/14/2018    ALKPHOS 101 (H) 06/14/2018    BILITOT 0.7 06/14/2018    ALBUMIN 4.64 06/14/2018     No results found for: LIPASE  No results found for: HGBA1C, TSH  No results found for: INR  Lab Results   Component Value Date    HEPAIGM Non-Reactive 07/10/2018    HEPBSAG Non-Reactive 07/10/2018     Lab Results   Component Value Date    THCURSCR Negative 08/10/2018    PCPUR Negative 08/10/2018    COCAINEUR Negative 08/10/2018    METAMPSCNUR Negative 08/10/2018    LABOPIASCN Positive (A) 08/10/2018    AMPHETSCREEN Negative 08/10/2018    LABBENZSCN Negative 08/10/2018    TRICYCLICSCN Negative 08/10/2018    LABMETHSCN Negative 08/10/2018    BARBITSCNUR Negative 08/10/2018    OXYCODONESCN Negative 08/10/2018    PROPOXSCN Negative 08/10/2018    BUPRENORSCNU Negative 08/10/2018     Wt Readings from Last 5 Encounters:   10/17/18 126 kg (278 lb)   08/10/18 124 kg (273 lb 3.2 oz)   07/11/18 121 kg (266 lb)   07/10/18 116 kg (255 lb)   06/25/18 111 kg (245 lb)   body mass index is 41.03 kg/m².,Temp: 99.3 °F (37.4 °C),BP: 140/100,Heart  Rate: 103   Physical Exam  General Well developed; well nourished; no acute distress.   ENT Oral mucosa pink and moist without thrush or lesions.    GI Abd soft, NT, ND, normal active bowel sounds.  No HSM.  No abd hernia    Pt care team: Jacy SAMUEL & Leisa Baron MA  [facility]--Gastroenterology  [phone]    CC: , [place], [place]   [address] [place] [address] / [place] [address] FAX:[phone]

## 2018-10-19 LAB
HCV RNA SERPL NAA+PROBE-ACNC: NORMAL IU/ML
TEST INFORMATION: NORMAL

## 2019-02-01 ENCOUNTER — OFFICE VISIT (OUTPATIENT)
Dept: FAMILY MEDICINE CLINIC | Facility: CLINIC | Age: 29
End: 2019-02-01

## 2019-02-01 VITALS
HEIGHT: 69 IN | SYSTOLIC BLOOD PRESSURE: 150 MMHG | WEIGHT: 277 LBS | TEMPERATURE: 98 F | HEART RATE: 88 BPM | RESPIRATION RATE: 20 BRPM | BODY MASS INDEX: 41.03 KG/M2 | DIASTOLIC BLOOD PRESSURE: 95 MMHG

## 2019-02-01 DIAGNOSIS — J01.00 ACUTE NON-RECURRENT MAXILLARY SINUSITIS: Primary | ICD-10-CM

## 2019-02-01 PROCEDURE — 99213 OFFICE O/P EST LOW 20 MIN: CPT | Performed by: NURSE PRACTITIONER

## 2019-02-01 RX ORDER — AMOXICILLIN 875 MG/1
875 TABLET, COATED ORAL 2 TIMES DAILY
Qty: 14 TABLET | Refills: 0 | Status: SHIPPED | OUTPATIENT
Start: 2019-02-01 | End: 2019-02-12

## 2019-02-01 NOTE — PROGRESS NOTES
Subjective   Sagar Iniguez is a 28 y.o. male.     History of Present Illness   Sinus congestion, facial pressure and pain, PND, yellow thick nasal secretions for the past 3 weeks, was feeling some better then started feeling worse again  No fever or chills, no cough or difficulty breathing, no dizziness or lightheadedness  Taking OTC cold med multi sx with some relief     The following portions of the patient's history were reviewed and updated as appropriate: allergies, current medications, past family history, past medical history, past social history, past surgical history and problem list.    Review of Systems   Constitutional: Negative for chills, fatigue and fever.   HENT: Positive for congestion, ear pain, postnasal drip, rhinorrhea, sinus pressure and sore throat. Negative for sneezing and trouble swallowing.    Eyes: Negative.  Negative for discharge and redness.   Respiratory: Negative.  Negative for cough.    Cardiovascular: Negative.    Gastrointestinal: Negative.    Musculoskeletal: Negative for myalgias, neck pain and neck stiffness.   Skin: Negative.    Neurological: Negative.  Negative for dizziness and light-headedness.   Hematological: Negative.    Psychiatric/Behavioral: Negative.  Negative for sleep disturbance.       Objective   Physical Exam   Constitutional: He is oriented to person, place, and time. He appears well-developed and well-nourished. No distress.   HENT:   Head: Normocephalic.   Right Ear: Tympanic membrane, external ear and ear canal normal.   Left Ear: Tympanic membrane, external ear and ear canal normal.   Nose: Mucosal edema and rhinorrhea present. Right sinus exhibits maxillary sinus tenderness and frontal sinus tenderness. Left sinus exhibits maxillary sinus tenderness and frontal sinus tenderness.   Mouth/Throat: Uvula is midline, oropharynx is clear and moist and mucous membranes are normal. No oropharyngeal exudate, posterior oropharyngeal edema, posterior oropharyngeal  erythema or tonsillar abscesses.   Eyes: Conjunctivae are normal.   Neck: Neck supple.   Cardiovascular: Normal rate, regular rhythm, S1 normal, S2 normal and normal heart sounds.   Pulmonary/Chest: Effort normal and breath sounds normal. No respiratory distress. He has no wheezes.   Lymphadenopathy:        Head (right side): No tonsillar, no preauricular, no posterior auricular and no occipital adenopathy present.        Head (left side): No tonsillar, no preauricular, no posterior auricular and no occipital adenopathy present.     He has no cervical adenopathy.   Neurological: He is alert and oriented to person, place, and time.   Skin: Skin is warm and dry. He is not diaphoretic.   Psychiatric: He has a normal mood and affect. His behavior is normal. Judgment and thought content normal.   Nursing note and vitals reviewed.        Assessment/Plan   Sagar was seen today for sinus congestion, drainage.    Diagnoses and all orders for this visit:    Acute non-recurrent maxillary sinusitis  -     amoxicillin (AMOXIL) 875 MG tablet; Take 1 tablet by mouth 2 (Two) Times a Day.      Continue OTC cold medication for symptoms  Take Amoxil until gone, follow up as needed.

## 2019-02-12 ENCOUNTER — OFFICE VISIT (OUTPATIENT)
Dept: FAMILY MEDICINE CLINIC | Facility: CLINIC | Age: 29
End: 2019-02-12

## 2019-02-12 VITALS
WEIGHT: 266 LBS | TEMPERATURE: 96.1 F | OXYGEN SATURATION: 97 % | SYSTOLIC BLOOD PRESSURE: 128 MMHG | BODY MASS INDEX: 39.26 KG/M2 | DIASTOLIC BLOOD PRESSURE: 82 MMHG | HEART RATE: 93 BPM

## 2019-02-12 DIAGNOSIS — R06.02 SOB (SHORTNESS OF BREATH): ICD-10-CM

## 2019-02-12 DIAGNOSIS — R05.9 COUGH: ICD-10-CM

## 2019-02-12 DIAGNOSIS — J20.8 ACUTE BRONCHITIS DUE TO OTHER SPECIFIED ORGANISMS: Primary | ICD-10-CM

## 2019-02-12 PROCEDURE — 99213 OFFICE O/P EST LOW 20 MIN: CPT | Performed by: PHYSICIAN ASSISTANT

## 2019-02-12 RX ORDER — FLUTICASONE PROPIONATE 50 MCG
2 SPRAY, SUSPENSION (ML) NASAL DAILY
Qty: 16 G | Refills: 0 | Status: SHIPPED | OUTPATIENT
Start: 2019-02-12 | End: 2019-07-22

## 2019-02-12 RX ORDER — ALBUTEROL SULFATE 90 UG/1
2 AEROSOL, METERED RESPIRATORY (INHALATION) EVERY 4 HOURS PRN
Qty: 1 INHALER | Refills: 0 | Status: SHIPPED | OUTPATIENT
Start: 2019-02-12 | End: 2019-07-22

## 2019-02-12 RX ORDER — PREDNISONE 10 MG/1
TABLET ORAL
Qty: 21 TABLET | Refills: 0 | Status: SHIPPED | OUTPATIENT
Start: 2019-02-12 | End: 2019-02-20

## 2019-02-12 RX ORDER — DOXYCYCLINE 100 MG/1
100 CAPSULE ORAL EVERY 12 HOURS SCHEDULED
Qty: 20 CAPSULE | Refills: 0 | Status: SHIPPED | OUTPATIENT
Start: 2019-02-12 | End: 2019-03-25

## 2019-02-12 RX ORDER — DEXTROMETHORPHAN HYDROBROMIDE AND PROMETHAZINE HYDROCHLORIDE 15; 6.25 MG/5ML; MG/5ML
5 SYRUP ORAL 4 TIMES DAILY PRN
Qty: 180 ML | Refills: 0 | Status: SHIPPED | OUTPATIENT
Start: 2019-02-12 | End: 2019-02-20 | Stop reason: SDUPTHER

## 2019-02-12 NOTE — PROGRESS NOTES
Subjective   Sagar Iniguez is a 28 y.o. male.     History of Present Illness   Pt presents with CC of cough with yellow mucus production, sinus congestion, drainage, SOB, wheezing, fatigue, subjective fever for the last several weeks. Was treated two weeks ago for sinus infection with amoxicillin, mild improvement but feels like it moved down to chest.   Pt is former smoker   Has hardware in his back and this has exacerbated back pain with all the coughing (still has not gotten surgery for broken back hardware- making him lose 30 lbs, make sure hep C is cleared, and had to correct his vitamin D def)     The following portions of the patient's history were reviewed and updated as appropriate: allergies, current medications, past family history, past medical history, past social history, past surgical history and problem list.    Review of Systems   Constitutional: Positive for diaphoresis and fatigue. Negative for chills and fever.   HENT: Positive for congestion, postnasal drip and sinus pressure. Negative for ear discharge, ear pain, hearing loss, nosebleeds, sinus pain, sneezing and sore throat.    Eyes: Negative.    Respiratory: Positive for cough, shortness of breath and wheezing. Negative for chest tightness.    Cardiovascular: Negative.  Negative for chest pain, palpitations and leg swelling.   Gastrointestinal: Negative for abdominal distention, abdominal pain, blood in stool, constipation, diarrhea, nausea and vomiting.   Genitourinary: Negative.  Negative for difficulty urinating, dysuria, flank pain, frequency, hematuria and urgency.   Musculoskeletal: Positive for back pain and gait problem. Negative for arthralgias, joint swelling, myalgias, neck pain and neck stiffness.   Skin: Negative.  Negative for color change, pallor, rash and wound.   Neurological: Negative for dizziness, syncope, weakness, light-headedness, numbness and headaches.       Objective   Physical Exam   Constitutional: He is oriented to  person, place, and time. He appears well-developed and well-nourished.   HENT:   Head: Normocephalic and atraumatic.   Right Ear: Tympanic membrane, external ear and ear canal normal.   Left Ear: Tympanic membrane, external ear and ear canal normal.   Nose: Nose normal.   Mouth/Throat: Oropharynx is clear and moist. No oropharyngeal exudate.   Eyes: Conjunctivae are normal.   Neck: Normal range of motion. Neck supple. No tracheal deviation present. No thyromegaly present.   Cardiovascular: Normal rate, regular rhythm, normal heart sounds and intact distal pulses. Exam reveals no gallop and no friction rub.   No murmur heard.  Pulmonary/Chest: Effort normal. No respiratory distress. He has wheezes. He has rhonchi. He has no rales. He exhibits no tenderness.   Lymphadenopathy:     He has no cervical adenopathy.   Neurological: He is alert and oriented to person, place, and time.   Skin: Skin is warm. He is diaphoretic.   Psychiatric: He has a normal mood and affect. His behavior is normal. Judgment and thought content normal.   Nursing note and vitals reviewed.      Assessment/Plan   Sagar was seen today for sinus problem and cough.    Diagnoses and all orders for this visit:    Acute bronchitis due to other specified organisms  -     doxycycline (MONODOX) 100 MG capsule; Take 1 capsule by mouth Every 12 (Twelve) Hours.  -     predniSONE (DELTASONE) 10 MG tablet; Take 60 mg po day 1, 50 mg po day 2, 40 mg po day 3, 30 mg po day 4, 20 mg po day 5, 10 mg po day 6  -     fluticasone (FLONASE) 50 MCG/ACT nasal spray; 2 sprays into the nostril(s) as directed by provider Daily.    SOB (shortness of breath)  -     albuterol sulfate  (90 Base) MCG/ACT inhaler; Inhale 2 puffs Every 4 (Four) Hours As Needed for Wheezing or Shortness of Air.    Cough  -     promethazine-dextromethorphan (PROMETHAZINE-DM) 6.25-15 MG/5ML syrup; Take 5 mL by mouth 4 (Four) Times a Day As Needed for Cough.    treatment as outlined in plan. F/U  INB

## 2019-02-13 ENCOUNTER — LAB (OUTPATIENT)
Dept: LAB | Facility: HOSPITAL | Age: 29
End: 2019-02-13

## 2019-02-13 ENCOUNTER — OFFICE VISIT (OUTPATIENT)
Dept: GASTROENTEROLOGY | Facility: CLINIC | Age: 29
End: 2019-02-13

## 2019-02-13 VITALS
DIASTOLIC BLOOD PRESSURE: 90 MMHG | HEIGHT: 69 IN | TEMPERATURE: 97.3 F | HEART RATE: 88 BPM | WEIGHT: 278.6 LBS | SYSTOLIC BLOOD PRESSURE: 150 MMHG | BODY MASS INDEX: 41.26 KG/M2 | OXYGEN SATURATION: 97 %

## 2019-02-13 DIAGNOSIS — B18.2 HEP C W/O COMA, CHRONIC (HCC): ICD-10-CM

## 2019-02-13 DIAGNOSIS — B18.2 HEP C W/O COMA, CHRONIC (HCC): Primary | ICD-10-CM

## 2019-02-13 DIAGNOSIS — E66.01 MORBID OBESITY WITH BMI OF 40.0-44.9, ADULT (HCC): ICD-10-CM

## 2019-02-13 DIAGNOSIS — R74.8 ALKALINE PHOSPHATASE ELEVATION: ICD-10-CM

## 2019-02-13 DIAGNOSIS — Z23 NEED FOR VACCINATION AGAINST HEPATITIS A: ICD-10-CM

## 2019-02-13 LAB
ALBUMIN SERPL-MCNC: 5.34 G/DL (ref 3.2–4.8)
ALBUMIN/GLOB SERPL: 1.9 G/DL (ref 1.5–2.5)
ALP SERPL-CCNC: 110 U/L (ref 25–100)
ALT SERPL W P-5'-P-CCNC: 34 U/L (ref 7–40)
ANION GAP SERPL CALCULATED.3IONS-SCNC: 7 MMOL/L (ref 3–11)
AST SERPL-CCNC: 20 U/L (ref 0–33)
BASOPHILS # BLD AUTO: 0.02 10*3/MM3 (ref 0–0.2)
BASOPHILS NFR BLD AUTO: 0.1 % (ref 0–1)
BILIRUB SERPL-MCNC: 1.1 MG/DL (ref 0.3–1.2)
BUN BLD-MCNC: 16 MG/DL (ref 9–23)
BUN/CREAT SERPL: 20.5 (ref 7–25)
CALCIUM SPEC-SCNC: 10 MG/DL (ref 8.7–10.4)
CHLORIDE SERPL-SCNC: 105 MMOL/L (ref 99–109)
CO2 SERPL-SCNC: 25 MMOL/L (ref 20–31)
CREAT BLD-MCNC: 0.78 MG/DL (ref 0.6–1.3)
DEPRECATED RDW RBC AUTO: 48.8 FL (ref 37–54)
EOSINOPHIL # BLD AUTO: 0.05 10*3/MM3 (ref 0–0.3)
EOSINOPHIL NFR BLD AUTO: 0.3 % (ref 0–3)
ERYTHROCYTE [DISTWIDTH] IN BLOOD BY AUTOMATED COUNT: 15.2 % (ref 11.3–14.5)
GFR SERPL CREATININE-BSD FRML MDRD: 118 ML/MIN/1.73
GGT SERPL-CCNC: 67 U/L (ref 0–72)
GLOBULIN UR ELPH-MCNC: 2.9 GM/DL
GLUCOSE BLD-MCNC: 88 MG/DL (ref 70–100)
HCT VFR BLD AUTO: 45.6 % (ref 38.9–50.9)
HGB BLD-MCNC: 15.3 G/DL (ref 13.1–17.5)
IMM GRANULOCYTES # BLD AUTO: 0.18 10*3/MM3 (ref 0–0.05)
IMM GRANULOCYTES NFR BLD AUTO: 1 % (ref 0–0.6)
LYMPHOCYTES # BLD AUTO: 1.57 10*3/MM3 (ref 0.6–4.8)
LYMPHOCYTES NFR BLD AUTO: 8.4 % (ref 24–44)
MCH RBC QN AUTO: 29.5 PG (ref 27–31)
MCHC RBC AUTO-ENTMCNC: 33.6 G/DL (ref 32–36)
MCV RBC AUTO: 87.9 FL (ref 80–99)
MONOCYTES # BLD AUTO: 0.85 10*3/MM3 (ref 0–1)
MONOCYTES NFR BLD AUTO: 4.6 % (ref 0–12)
NEUTROPHILS # BLD AUTO: 16.09 10*3/MM3 (ref 1.5–8.3)
NEUTROPHILS NFR BLD AUTO: 86.6 % (ref 41–71)
PLATELET # BLD AUTO: 286 10*3/MM3 (ref 150–450)
PMV BLD AUTO: 9.6 FL (ref 6–12)
POTASSIUM BLD-SCNC: 4.5 MMOL/L (ref 3.5–5.5)
PROT SERPL-MCNC: 8.2 G/DL (ref 5.7–8.2)
RBC # BLD AUTO: 5.19 10*6/MM3 (ref 4.2–5.76)
SODIUM BLD-SCNC: 137 MMOL/L (ref 132–146)
WBC NRBC COR # BLD: 18.58 10*3/MM3 (ref 3.5–10.8)

## 2019-02-13 PROCEDURE — 80053 COMPREHEN METABOLIC PANEL: CPT

## 2019-02-13 PROCEDURE — 87522 HEPATITIS C REVRS TRNSCRPJ: CPT

## 2019-02-13 PROCEDURE — 99213 OFFICE O/P EST LOW 20 MIN: CPT | Performed by: NURSE PRACTITIONER

## 2019-02-13 PROCEDURE — 36415 COLL VENOUS BLD VENIPUNCTURE: CPT

## 2019-02-13 PROCEDURE — 85025 COMPLETE CBC W/AUTO DIFF WBC: CPT

## 2019-02-13 PROCEDURE — 82977 ASSAY OF GGT: CPT | Performed by: NURSE PRACTITIONER

## 2019-02-13 PROCEDURE — 90471 IMMUNIZATION ADMIN: CPT | Performed by: NURSE PRACTITIONER

## 2019-02-13 PROCEDURE — 90632 HEPA VACCINE ADULT IM: CPT | Performed by: NURSE PRACTITIONER

## 2019-02-13 NOTE — PROGRESS NOTES
GASTROENTEROLOGY OUTPATIENT ESTABLISHED PATIENT NOTE  Patient: ILSA VERAS : 1990  Date of Service: 2019  CC: Follow-up    Assessment/Plan                                             ASSESSMENT & PLANS     Hep C w/o coma, chronic (CMS/HCC) Genotype 3.  No Fibrosis. Metavir score of F0. S/p Mavyret (8-wk) course Sept through Oct 2018  -     Comprehensive Metabolic Panel; Future  -     CBC Auto Differential; Future  -     Hepatitis C RNA, Quantitative, PCR (graph); Future    Need for vaccination against hepatitis A  -     Hepatitis A Vaccine Adult IM.  Last dose today    Morbid obesity with BMI of 40.0-44.9, adult (CMS/HCC)  -     Ambulatory Referral to Weight Management Program    Follow Up:Return in about 6 months (around 2019).      DISCUSSION: The above plan was delineated in details with patient and all questions and concerns were answered.  Patient is also given contact information.  Patient is to return as scheduled or sooner if new problems arise.   Subjective                                                     SUBJECTIVE   History of Present Illness  Mr. Ilsa Veras is a 28 y.o. male who is here for Follow-up  Presumably contracted Hep C from a haircut while incarcerated.  No hx of recreational drug use.   Started Mavyret 18 w/o complications.  Pt denies any abdominal pain, including RUQ abdominal pain.  Pt denies jaundice, pruritus, stool or urine color changes, palmar erythema, or any skin changes. No stigmata of liver disease.    History of lumbar fracture in  status post back surgeries.  Continues to walk with a cane.  Pt is in the process of needing back surgery again, but pt was recommended to lose wt before surgery. Pt has difficulty losing wt.  Has stopped drinking all soda.  Admits of eating late at night.      ROS:Review of Systems   Constitutional: Negative.         Pt currently or recently takes NSAIDS ( (i.e Ibuprofen, Aleve, Advil, Exedrin, BC Powder, diclofenac,  meloxicam, & Naproxen, etc)? No    Pt currently or recently takes abx? Yes   HENT: Negative.    Eyes: Negative.    Respiratory: Negative.    Cardiovascular: Negative.    Gastrointestinal: Negative.    Endocrine: Negative.    Genitourinary: Negative.    Musculoskeletal: Positive for gait problem (cane).   Skin: Negative.    Allergic/Immunologic: Negative.    Hematological: Negative.    Psychiatric/Behavioral: Negative.      Objective                                                           OBJECTIVE   Allergy: Pt is allergic to hydrocodone.  MEDS: •  albuterol sulfate  (90 Base) MCG/ACT inhaler, Inhale 2 puffs Every 4 (Four) Hours As Needed for Wheezing or Shortness of Air., Disp: 1 inhaler, Rfl: 0  •  doxycycline (MONODOX) 100 MG capsule, Take 1 capsule by mouth Every 12 (Twelve) Hours., Disp: 20 capsule, Rfl: 0  •  fluticasone (FLONASE) 50 MCG/ACT nasal spray, 2 sprays into the nostril(s) as directed by provider Daily., Disp: 16 g, Rfl: 0  •  predniSONE (DELTASONE) 10 MG tablet, Take 60 mg po day 1, 50 mg po day 2, 40 mg po day 3, 30 mg po day 4, 20 mg po day 5, 10 mg po day 6, Disp: 21 tablet, Rfl: 0  •  promethazine-dextromethorphan (PROMETHAZINE-DM) 6.25-15 MG/5ML syrup, Take 5 mL by mouth 4 (Four) Times a Day As Needed for Cough., Disp: 180 mL, Rfl: 0  Lab Results   Component Value Date    WBC 9.13 10/17/2018    WBC 6.52 06/14/2018    EOSABS 0.42 (H) 10/17/2018    EOSABS 0.33 (H) 06/14/2018    HGB 14.4 10/17/2018    HGB 14.3 06/14/2018    HCT 41.5 10/17/2018    HCT 43.4 06/14/2018    MCV 86.3 10/17/2018    MCV 88.2 06/14/2018    MCHC 34.7 10/17/2018    MCHC 32.9 06/14/2018     10/17/2018     06/14/2018     Lab Results   Component Value Date     10/17/2018    K 4.0 10/17/2018    CL 98 (L) 10/17/2018    CO2 27.0 10/17/2018    BUN 13 10/17/2018    BUN 16 06/14/2018    CREATININE 0.67 10/17/2018    CREATININE 0.71 06/14/2018    EGFRIFNONA 140 10/17/2018    EGFRIFNONA 131 06/14/2018     GLUCOSE 94 10/17/2018    CALCIUM 9.4 10/17/2018    ANIONGAP 7.0 10/17/2018     Lab Results   Component Value Date    AST 26 10/17/2018    AST 35 (H) 06/14/2018    ALT 36 10/17/2018    ALT 52 (H) 06/14/2018    ALKPHOS 112 (H) 10/17/2018    ALKPHOS 101 (H) 06/14/2018    BILITOT 0.7 10/17/2018    BILITOT 0.7 06/14/2018    ALBUMIN 4.62 10/17/2018    ALBUMIN 4.64 06/14/2018      Lab Results   Component Value Date    HEPAIGM Non-Reactive 07/10/2018    HEPBSAG Non-Reactive 07/10/2018     Lab Results   Component Value Date    THCURSCR Negative 08/10/2018    PCPUR Negative 08/10/2018    COCAINEUR Negative 08/10/2018    METAMPSCNUR Negative 08/10/2018    LABOPIASCN Positive (A) 08/10/2018    AMPHETSCREEN Negative 08/10/2018    LABBENZSCN Negative 08/10/2018    TRICYCLICSCN Negative 08/10/2018    LABMETHSCN Negative 08/10/2018    BARBITSCNUR Negative 08/10/2018    OXYCODONESCN Negative 08/10/2018    PROPOXSCN Negative 08/10/2018    BUPRENORSCNU Negative 08/10/2018     Wt Readings from Last 5 Encounters:   02/13/19 126 kg (278 lb 9.6 oz)   02/12/19 121 kg (266 lb)   02/01/19 126 kg (277 lb)   10/17/18 126 kg (278 lb)   08/10/18 124 kg (273 lb 3.2 oz)   body mass index is 41.12 kg/m².,Temp: 97.3 °F (36.3 °C),BP: 150/90,Heart Rate: 88   Physical Exam  General Well developed; well nourished; no acute distress.   ENT Oral mucosa pink and moist without thrush or lesions.    GI Abd soft, NT, ND, normal active bowel sounds.  No HSM.  No abd hernia    Pt care team: Jacy SAMUEL & SHILPI Weems  02/13/19 11:20 AM  [facility]--Gastroenterology  [phone]    CC: Dr.NellySelma mancera  [address] / [place] [id number] FAX:[phone]

## 2019-02-15 LAB
HCV RNA SERPL NAA+PROBE-ACNC: NORMAL IU/ML
TEST INFORMATION: NORMAL

## 2019-02-20 ENCOUNTER — TELEPHONE (OUTPATIENT)
Dept: FAMILY MEDICINE CLINIC | Facility: CLINIC | Age: 29
End: 2019-02-20

## 2019-02-20 ENCOUNTER — OFFICE VISIT (OUTPATIENT)
Dept: FAMILY MEDICINE CLINIC | Facility: CLINIC | Age: 29
End: 2019-02-20

## 2019-02-20 VITALS
TEMPERATURE: 97.6 F | RESPIRATION RATE: 18 BRPM | HEART RATE: 88 BPM | SYSTOLIC BLOOD PRESSURE: 152 MMHG | DIASTOLIC BLOOD PRESSURE: 90 MMHG | WEIGHT: 268 LBS | BODY MASS INDEX: 39.69 KG/M2 | HEIGHT: 69 IN

## 2019-02-20 DIAGNOSIS — I10 ESSENTIAL HYPERTENSION: ICD-10-CM

## 2019-02-20 DIAGNOSIS — M54.42 CHRONIC MIDLINE LOW BACK PAIN WITH BILATERAL SCIATICA: Primary | ICD-10-CM

## 2019-02-20 DIAGNOSIS — S32.009K LUMBAR PSEUDOARTHROSIS: ICD-10-CM

## 2019-02-20 DIAGNOSIS — M54.41 CHRONIC MIDLINE LOW BACK PAIN WITH BILATERAL SCIATICA: Primary | ICD-10-CM

## 2019-02-20 DIAGNOSIS — R05.9 COUGH: ICD-10-CM

## 2019-02-20 DIAGNOSIS — G89.29 CHRONIC MIDLINE LOW BACK PAIN WITH BILATERAL SCIATICA: Primary | ICD-10-CM

## 2019-02-20 DIAGNOSIS — T84.84XA PAINFUL ORTHOPAEDIC HARDWARE (HCC): ICD-10-CM

## 2019-02-20 PROCEDURE — 99213 OFFICE O/P EST LOW 20 MIN: CPT | Performed by: PHYSICIAN ASSISTANT

## 2019-02-20 RX ORDER — LISINOPRIL 20 MG/1
20 TABLET ORAL DAILY
Qty: 30 TABLET | Refills: 2 | Status: SHIPPED | OUTPATIENT
Start: 2019-02-20 | End: 2019-04-24 | Stop reason: SDUPTHER

## 2019-02-20 RX ORDER — OXYCODONE HYDROCHLORIDE AND ACETAMINOPHEN 5; 325 MG/1; MG/1
1 TABLET ORAL EVERY 6 HOURS PRN
Qty: 60 TABLET | Refills: 0 | Status: SHIPPED | OUTPATIENT
Start: 2019-02-20 | End: 2019-03-25 | Stop reason: SDUPTHER

## 2019-02-20 RX ORDER — DEXTROMETHORPHAN HYDROBROMIDE AND PROMETHAZINE HYDROCHLORIDE 15; 6.25 MG/5ML; MG/5ML
5 SYRUP ORAL 4 TIMES DAILY PRN
Qty: 180 ML | Refills: 0 | Status: SHIPPED | OUTPATIENT
Start: 2019-02-20 | End: 2019-03-25

## 2019-02-20 NOTE — TELEPHONE ENCOUNTER
----- Message from Adriana Vargas sent at 2/20/2019  4:12 PM EST -----  Contact: JACKIEPERNELL  PATIENT IS CALLING TO CHECK ON THE PERCOCET THAT YOU SAID WOULD BE SENT IN FOR HIM. IT STILL HASN'T GOTTEN TO HIS PHARMACY. PLEASE CALL BACK AT [phone]

## 2019-02-20 NOTE — PROGRESS NOTES
"Subjective   Sagar Iniguez is a 28 y.o. male.     History of Present Illness   Pt presents with CC of low back pain. Pt is awaiting back surgery to correct broken orthotic device. Currently has to loose about 20 more pounds before they will proceed. Recent bronchitis. Feeling better but still struggling with cough that has exacerbated pain   Was in pain management, however could not afford to keep going as he was paying out of pocket.   Was referred to weight loss clinic, but also can not afford this as he has to pay out of pocket. Has been cutting out pop, carbs which has helped, but unfortunately can not exercise due to level of discomfort.   Very anxious to get surgery and get his \"life back\"    Blood pressure has consistently been elevated. Pt agreeable to try medication to help lower this.     The following portions of the patient's history were reviewed and updated as appropriate: allergies, current medications, past family history, past medical history, past social history, past surgical history and problem list.    Review of Systems   Constitutional: Negative.  Negative for chills, fatigue and fever.   HENT: Negative.  Negative for congestion, ear discharge, ear pain, hearing loss, nosebleeds, postnasal drip, sinus pressure, sneezing and sore throat.    Eyes: Negative.    Respiratory: Positive for cough and wheezing. Negative for chest tightness and shortness of breath.    Cardiovascular: Negative.  Negative for chest pain, palpitations and leg swelling.   Gastrointestinal: Negative for abdominal distention, abdominal pain, blood in stool, constipation, diarrhea, nausea and vomiting.   Genitourinary: Negative.  Negative for difficulty urinating, dysuria, flank pain, frequency, hematuria and urgency.   Musculoskeletal: Positive for back pain and gait problem. Negative for arthralgias, joint swelling, myalgias, neck pain and neck stiffness.   Skin: Negative.  Negative for color change, pallor, rash and wound. " "  Neurological: Negative for dizziness, syncope, weakness, light-headedness, numbness and headaches.       Objective    Blood pressure 152/90, pulse 88, temperature 97.6 °F (36.4 °C), resp. rate 18, height 175.3 cm (69\"), weight 122 kg (268 lb).     Physical Exam   Constitutional: He is oriented to person, place, and time. He appears well-developed and well-nourished.   HENT:   Head: Normocephalic and atraumatic.   Right Ear: External ear normal.   Left Ear: External ear normal.   Nose: Nose normal.   Mouth/Throat: Oropharynx is clear and moist. No oropharyngeal exudate.   Eyes: Conjunctivae are normal.   Neck: Normal range of motion. Neck supple. No tracheal deviation present. No thyromegaly present.   Cardiovascular: Normal rate, regular rhythm, normal heart sounds and intact distal pulses. Exam reveals no gallop and no friction rub.   No murmur heard.  Pulmonary/Chest: Effort normal. No respiratory distress. He has wheezes. He has no rales. He exhibits no tenderness.   Abdominal: Soft. Bowel sounds are normal. He exhibits no distension and no mass. There is no tenderness. There is no rebound and no guarding. No hernia.   Musculoskeletal: He exhibits no edema or deformity.        Lumbar back: He exhibits decreased range of motion, tenderness and bony tenderness.   Larger surgical scar down midline of back     Ambulates with cane. Antalgic gait.    Lymphadenopathy:     He has no cervical adenopathy.   Neurological: He is alert and oriented to person, place, and time.   Skin: Skin is warm and dry.   Psychiatric: He has a normal mood and affect. His behavior is normal. Judgment and thought content normal.   Nursing note and vitals reviewed.      Assessment/Plan   Sagar was seen today for back pain.    Diagnoses and all orders for this visit:    Chronic midline low back pain with bilateral sciatica    Essential hypertension  -     lisinopril (PRINIVIL,ZESTRIL) 20 MG tablet; Take 1 tablet by mouth Daily.    Lumbar " pseudoarthrosis    Painful orthopaedic hardware (CMS/HCC)    Cough  -     promethazine-dextromethorphan (PROMETHAZINE-DM) 6.25-15 MG/5ML syrup; Take 5 mL by mouth 4 (Four) Times a Day As Needed for Cough.    begin lisinopril as outlined in plan   Will request temporary pain management to help with severe low back pain. Pt aware of risk of abuse and dependence. Agrees to take only as directed.   Complete antibiotic as prescribed. Refill on cough suppressant and keep with inhaler as needed.

## 2019-03-25 ENCOUNTER — OFFICE VISIT (OUTPATIENT)
Dept: FAMILY MEDICINE CLINIC | Facility: CLINIC | Age: 29
End: 2019-03-25

## 2019-03-25 ENCOUNTER — TELEPHONE (OUTPATIENT)
Dept: FAMILY MEDICINE CLINIC | Facility: CLINIC | Age: 29
End: 2019-03-25

## 2019-03-25 VITALS
BODY MASS INDEX: 39.99 KG/M2 | OXYGEN SATURATION: 100 % | DIASTOLIC BLOOD PRESSURE: 86 MMHG | TEMPERATURE: 97.9 F | WEIGHT: 270 LBS | HEART RATE: 109 BPM | SYSTOLIC BLOOD PRESSURE: 142 MMHG | RESPIRATION RATE: 18 BRPM | HEIGHT: 69 IN

## 2019-03-25 DIAGNOSIS — E66.01 CLASS 2 SEVERE OBESITY DUE TO EXCESS CALORIES WITH SERIOUS COMORBIDITY AND BODY MASS INDEX (BMI) OF 39.0 TO 39.9 IN ADULT (HCC): ICD-10-CM

## 2019-03-25 DIAGNOSIS — T84.84XA PAINFUL ORTHOPAEDIC HARDWARE (HCC): Primary | ICD-10-CM

## 2019-03-25 DIAGNOSIS — M54.42 CHRONIC BILATERAL LOW BACK PAIN WITH BILATERAL SCIATICA: ICD-10-CM

## 2019-03-25 DIAGNOSIS — G89.29 CHRONIC BILATERAL LOW BACK PAIN WITH BILATERAL SCIATICA: ICD-10-CM

## 2019-03-25 DIAGNOSIS — R63.5 WEIGHT GAIN: ICD-10-CM

## 2019-03-25 DIAGNOSIS — Q74.2 CONGENITAL FOOT ABNORMALITY: ICD-10-CM

## 2019-03-25 DIAGNOSIS — E66.01 CLASS 2 SEVERE OBESITY DUE TO EXCESS CALORIES WITH SERIOUS COMORBIDITY AND BODY MASS INDEX (BMI) OF 39.0 TO 39.9 IN ADULT (HCC): Primary | ICD-10-CM

## 2019-03-25 DIAGNOSIS — M54.41 CHRONIC BILATERAL LOW BACK PAIN WITH BILATERAL SCIATICA: ICD-10-CM

## 2019-03-25 PROCEDURE — 99214 OFFICE O/P EST MOD 30 MIN: CPT | Performed by: PHYSICIAN ASSISTANT

## 2019-03-25 RX ORDER — CYCLOBENZAPRINE HCL 10 MG
10 TABLET ORAL 3 TIMES DAILY PRN
Qty: 90 TABLET | Refills: 0 | Status: SHIPPED | OUTPATIENT
Start: 2019-03-25 | End: 2019-04-24 | Stop reason: SDUPTHER

## 2019-03-25 RX ORDER — OXYCODONE HYDROCHLORIDE AND ACETAMINOPHEN 5; 325 MG/1; MG/1
1 TABLET ORAL EVERY 6 HOURS PRN
Qty: 60 TABLET | Refills: 0 | Status: SHIPPED | OUTPATIENT
Start: 2019-03-25 | End: 2019-04-22 | Stop reason: SDUPTHER

## 2019-03-25 RX ORDER — PHENTERMINE HYDROCHLORIDE 37.5 MG/1
TABLET ORAL
Qty: 30 TABLET | Refills: 0 | Status: SHIPPED | OUTPATIENT
Start: 2019-03-25 | End: 2019-04-24 | Stop reason: SDUPTHER

## 2019-03-25 RX ORDER — MELATONIN
1000 DAILY
COMMUNITY

## 2019-03-25 NOTE — TELEPHONE ENCOUNTER
Carlos Salter reviewed 3/25/2019     Also, let him know risks of phentermine including increased heart rate, arrhythmia, heart valve disorder, elevated blood pressure inside of lungs or pulmonary hypertension, abuse and dependence and needs to be seen on one month.

## 2019-03-25 NOTE — TELEPHONE ENCOUNTER
Refill on pain medication. Still can not get surgery until he loses 20 more pounds per surgeon (see consult note in chart). Has broken hardware in back and a burst fracture. Would like help to lose the weight so he can get the surgery completed. Sent to [facility]. SARATH

## 2019-03-25 NOTE — PROGRESS NOTES
Pt. Reports that she has received relief of symptom after medication administration   Subjective   Sagar Iniguez is a 29 y.o. male.     History of Present Illness   F/U for low back pain. See previous office notes. Long standing back issues. Was doing better until fall last year which resulted in him breaking hardware in his back, confirmed with MRI.    Dr. Steiner at  has been evaluating patient for this issue. Progress to surgery to correct this issue has been slow. Pt had to get vitamin D levels corrected and Hep C resolved. Now patient has to lose 20 lbs more lbs before they will proceed with surgery. He needs to get down to 250 lbs, currently at 270. Patient is doing the best he can with nutrition changes, however where he is not able to exercise this weight loss has been very daunting. Ambulates with cane. Has braces on leg for congenital foot abnormality (right brace is broken- needs to have fixed, requesting for referral).    will not manage pain management. He is not able to afford pain management office they referred him to due to having to pay out of pocket   also set him up with weight loss office, however insurance was also not covering for this.     Percocet from our office, taking as needed. quantity of 60 has lasted him over a month. Currently out. Requesting refill     Needs help with weight loss     The following portions of the patient's history were reviewed and updated as appropriate: allergies, current medications, past family history, past medical history, past social history, past surgical history and problem list.    Review of Systems   Constitutional: Positive for unexpected weight change. Negative for chills, diaphoresis, fatigue and fever.   HENT: Negative.  Negative for congestion, ear discharge, ear pain, hearing loss, nosebleeds, postnasal drip, sinus pressure, sneezing and sore throat.    Eyes: Negative.    Respiratory: Negative.  Negative for cough, chest tightness, shortness of breath and wheezing.    Cardiovascular: Negative.  Negative for chest pain,  "palpitations and leg swelling.   Gastrointestinal: Negative for abdominal distention, abdominal pain, blood in stool, constipation, diarrhea, nausea and vomiting.   Genitourinary: Negative.  Negative for difficulty urinating, dysuria, flank pain, frequency, hematuria and urgency.   Musculoskeletal: Positive for back pain and gait problem. Negative for arthralgias, joint swelling, myalgias, neck pain and neck stiffness.   Skin: Negative.  Negative for color change, pallor, rash and wound.   Neurological: Negative for dizziness, syncope, weakness, light-headedness, numbness and headaches.       Objective    Blood pressure 142/86, pulse 109, temperature 97.9 °F (36.6 °C), temperature source Temporal, resp. rate 18, height 175.3 cm (69.02\"), weight 122 kg (270 lb), SpO2 100 %.     Physical Exam   Constitutional: He is oriented to person, place, and time. He appears well-developed and well-nourished.   HENT:   Head: Normocephalic and atraumatic.   Right Ear: External ear normal.   Left Ear: External ear normal.   Nose: Nose normal.   Mouth/Throat: Oropharynx is clear and moist. No oropharyngeal exudate.   Eyes: Conjunctivae are normal.   Neck: Normal range of motion. Neck supple. No tracheal deviation present. No thyromegaly present.   Cardiovascular: Normal rate, regular rhythm and normal heart sounds.   Pulmonary/Chest: Effort normal and breath sounds normal. No respiratory distress. He has no wheezes. He has no rales. He exhibits no tenderness.   Abdominal: There is no tenderness.   Musculoskeletal: He exhibits no edema or deformity.        Lumbar back: He exhibits decreased range of motion, tenderness, bony tenderness and pain.   Antalgic gait  Ambulates with walker   Brace on R foot/ leg    Lymphadenopathy:     He has no cervical adenopathy.   Neurological: He is alert and oriented to person, place, and time.   Skin: Skin is warm and dry.   Psychiatric: He has a normal mood and affect. His behavior is normal. " Judgment and thought content normal.   Nursing note and vitals reviewed.      Assessment/Plan   Sagar was seen today for back pain.    Diagnoses and all orders for this visit:    Painful orthopaedic hardware (CMS/HCC)    Chronic bilateral low back pain with bilateral sciatica  -     cyclobenzaprine (FLEXERIL) 10 MG tablet; Take 1 tablet by mouth 3 (Three) Times a Day As Needed for Muscle Spasms.    Class 2 severe obesity due to excess calories with serious comorbidity and body mass index (BMI) of 39.0 to 39.9 in adult (CMS/[facility])    Weight gain    BMI 39.0-39.9,adult    Congenital foot abnormality  -     Ambulatory Referral to Podiatry      Podiatry referral placed  Will request refill of his pain medication from [facility]. Pt agrees to taking only as directed. No issues with abuse or dependence  Refill on muscle relaxer sent to pharmacy     Also would like to initiate patient on weight loss medication so he can get to goal weight to obtain much needed back surgery. F/U in one month as directed

## 2019-04-22 ENCOUNTER — OFFICE VISIT (OUTPATIENT)
Dept: FAMILY MEDICINE CLINIC | Facility: CLINIC | Age: 29
End: 2019-04-22

## 2019-04-22 VITALS
BODY MASS INDEX: 36.43 KG/M2 | TEMPERATURE: 97.4 F | WEIGHT: 246 LBS | RESPIRATION RATE: 18 BRPM | DIASTOLIC BLOOD PRESSURE: 84 MMHG | HEIGHT: 69 IN | SYSTOLIC BLOOD PRESSURE: 126 MMHG | HEART RATE: 76 BPM

## 2019-04-22 DIAGNOSIS — E66.01 CLASS 2 SEVERE OBESITY DUE TO EXCESS CALORIES WITH SERIOUS COMORBIDITY AND BODY MASS INDEX (BMI) OF 36.0 TO 36.9 IN ADULT (HCC): ICD-10-CM

## 2019-04-22 DIAGNOSIS — T84.84XA PAINFUL ORTHOPAEDIC HARDWARE (HCC): Primary | ICD-10-CM

## 2019-04-22 PROCEDURE — 99213 OFFICE O/P EST LOW 20 MIN: CPT | Performed by: PHYSICIAN ASSISTANT

## 2019-04-22 NOTE — TELEPHONE ENCOUNTER
----- Message from Vanessa Price sent at 4/22/2019 11:59 AM EDT -----  Contact: PT CALLED.  CALLED HES SURGEON AND HE IS BEING SEEN MON MAY 6

## 2019-04-22 NOTE — TELEPHONE ENCOUNTER
Please refill patient's pain medication. Has met weight loss requirements for surgery. Plans on calling them today to see when he can schedule. LEÓN

## 2019-04-22 NOTE — PROGRESS NOTES
Subjective   Sagar Iniguez is a 29 y.o. male.     History of Present Illness   Pt presents for f/u for weight loss efforts. Required to lose 20-30 lbs prior to being approved for back surgery to correct broken hardware contributing to chronic low back pain   Pt has been on phentermine X 1 month now. Has lost 24 lbs since last visit! Been drinking protein shake with fruit in the mornings (24 g protein and low carb) and chicken, salad and vegetable for dinner around 4-6 pm. Medication does make him feel hyper and jittery.   Plans on calling surgeons office today to see if he finally qualifies to have his surgery     Has a few tablets of pain medication left. Taking them only as directed.        The following portions of the patient's history were reviewed and updated as appropriate: allergies, current medications, past family history, past medical history, past social history, past surgical history and problem list.    Review of Systems   Constitutional: Negative.  Negative for chills, diaphoresis, fatigue and fever.   HENT: Negative.  Negative for congestion, ear discharge, ear pain, hearing loss, nosebleeds, postnasal drip, sinus pressure, sneezing and sore throat.    Eyes: Negative.    Respiratory: Negative.  Negative for cough, chest tightness, shortness of breath and wheezing.    Cardiovascular: Negative.  Negative for chest pain, palpitations and leg swelling.   Gastrointestinal: Negative for abdominal distention, abdominal pain, blood in stool, constipation, diarrhea, nausea and vomiting.   Genitourinary: Negative.  Negative for difficulty urinating, dysuria, flank pain, frequency, hematuria and urgency.   Musculoskeletal: Positive for back pain and gait problem. Negative for arthralgias, joint swelling, myalgias, neck pain and neck stiffness.   Skin: Negative.  Negative for color change, pallor, rash and wound.   Neurological: Negative for dizziness, syncope, weakness, light-headedness, numbness and headaches.  "      Objective    Blood pressure 126/84, pulse 76, temperature 97.4 °F (36.3 °C), resp. rate 18, height 175.3 cm (69\"), weight 112 kg (246 lb).     Physical Exam  Constitutional: He is oriented to person, place, and time. He appears well-developed and well-nourished.   HENT:   Head: Normocephalic and atraumatic.   Right Ear: External ear normal.   Left Ear: External ear normal.   Nose: Nose normal.   Mouth/Throat: Oropharynx is clear and moist. No oropharyngeal exudate.   Eyes: Conjunctivae are normal.   Neck: Normal range of motion. Neck supple. No tracheal deviation present. No thyromegaly present.   Cardiovascular: Normal rate, regular rhythm and normal heart sounds.   Pulmonary/Chest: Effort normal and breath sounds normal. No respiratory distress. He has no wheezes. He has no rales. He exhibits no tenderness.   Abdominal: There is no tenderness.   Musculoskeletal: He exhibits no edema or deformity.        Lumbar back: He exhibits decreased range of motion, tenderness, bony tenderness and pain.   Antalgic gait  Ambulates with walker   Brace on R foot/ leg    Lymphadenopathy:     He has no cervical adenopathy.   Neurological: He is alert and oriented to person, place, and time.   Skin: Skin is warm and dry.   Psychiatric: He has a normal mood and affect. His behavior is normal. Judgment and thought content normal.   Nursing note and vitals reviewed.     Assessment/Plan   Sagar was seen today for weight check and back pain.    Diagnoses and all orders for this visit:    Painful orthopaedic hardware (CMS/[facility])    BMI 36.0-36.9,adult    Class 2 severe obesity due to excess calories with serious comorbidity and body mass index (BMI) of 36.0 to 36.9 in adult (CMS/[facility])      congratulated patient on weight loss achievement. Hopefully this will allow him to move on with his much needed surgery.   May decrease phentermine to 1/2 tablet daily due to jitteriness  Request refill of pain medication              "

## 2019-04-23 RX ORDER — OXYCODONE HYDROCHLORIDE AND ACETAMINOPHEN 5; 325 MG/1; MG/1
1 TABLET ORAL EVERY 6 HOURS PRN
Qty: 60 TABLET | Refills: 0 | Status: SHIPPED | OUTPATIENT
Start: 2019-04-23 | End: 2019-05-23 | Stop reason: SDUPTHER

## 2019-04-24 DIAGNOSIS — E66.01 CLASS 2 SEVERE OBESITY DUE TO EXCESS CALORIES WITH SERIOUS COMORBIDITY AND BODY MASS INDEX (BMI) OF 39.0 TO 39.9 IN ADULT (HCC): ICD-10-CM

## 2019-04-24 DIAGNOSIS — M54.41 CHRONIC BILATERAL LOW BACK PAIN WITH BILATERAL SCIATICA: ICD-10-CM

## 2019-04-24 DIAGNOSIS — M54.42 CHRONIC BILATERAL LOW BACK PAIN WITH BILATERAL SCIATICA: ICD-10-CM

## 2019-04-24 DIAGNOSIS — G89.29 CHRONIC BILATERAL LOW BACK PAIN WITH BILATERAL SCIATICA: ICD-10-CM

## 2019-04-24 DIAGNOSIS — I10 ESSENTIAL HYPERTENSION: ICD-10-CM

## 2019-04-24 RX ORDER — CYCLOBENZAPRINE HCL 10 MG
TABLET ORAL
Qty: 90 TABLET | Refills: 3 | Status: SHIPPED | OUTPATIENT
Start: 2019-04-24 | End: 2019-07-22

## 2019-04-24 RX ORDER — LISINOPRIL 20 MG/1
TABLET ORAL
Qty: 30 TABLET | Refills: 3 | Status: SHIPPED | OUTPATIENT
Start: 2019-04-24 | End: 2019-05-23 | Stop reason: SDUPTHER

## 2019-04-25 RX ORDER — PHENTERMINE HYDROCHLORIDE 37.5 MG/1
TABLET ORAL
Qty: 30 TABLET | Refills: 0 | Status: SHIPPED | OUTPATIENT
Start: 2019-04-25 | End: 2019-07-22

## 2019-05-23 ENCOUNTER — TELEPHONE (OUTPATIENT)
Dept: FAMILY MEDICINE CLINIC | Facility: CLINIC | Age: 29
End: 2019-05-23

## 2019-05-23 DIAGNOSIS — I10 ESSENTIAL HYPERTENSION: ICD-10-CM

## 2019-05-23 RX ORDER — LISINOPRIL 20 MG/1
20 TABLET ORAL DAILY
Qty: 30 TABLET | Refills: 3 | Status: SHIPPED | OUTPATIENT
Start: 2019-05-23 | End: 2019-07-22

## 2019-05-23 RX ORDER — OXYCODONE HYDROCHLORIDE AND ACETAMINOPHEN 5; 325 MG/1; MG/1
1 TABLET ORAL EVERY 6 HOURS PRN
Qty: 60 TABLET | Refills: 0 | Status: SHIPPED | OUTPATIENT
Start: 2019-05-23 | End: 2019-06-25 | Stop reason: SDUPTHER

## 2019-05-23 NOTE — TELEPHONE ENCOUNTER
Dr. Steve,   Would you please refill patient's pain medication? chronic pain of low back from broken  hardware and aware that any ongoing pain medication will need to be managed by his surgeon after August.     [place]ma

## 2019-05-23 NOTE — TELEPHONE ENCOUNTER
Informed pt. Pt states his surgery is scheduled for August, and would like refills for pain medication until then, if possible.

## 2019-05-23 NOTE — TELEPHONE ENCOUNTER
----- Message from Yuri Casanova sent at 5/23/2019  9:11 AM EDT -----  Contact: PT; MAY  REFILL    oxyCODONE-acetaminophen (PERCOCET) 5-325 MG per tablet     phentermine (ADIPEX-P) 37.5 MG tablet    lisinopril (PRINIVIL,ZESTRIL) 20 MG tablet     [facility]    PT: [phone]

## 2019-05-23 NOTE — TELEPHONE ENCOUNTER
Please call patient. We had decided to stop the adipex because he achieved the desired weight loss and he did not like how it made him feel.   What is the status of surgery? We were only doing pain medication temporarily until he followed up with his surgeon for his procedure.   Will send lisinopril into pharmacy.   LEÓN

## 2019-06-25 ENCOUNTER — TELEPHONE (OUTPATIENT)
Dept: FAMILY MEDICINE CLINIC | Facility: CLINIC | Age: 29
End: 2019-06-25

## 2019-06-25 RX ORDER — OXYCODONE HYDROCHLORIDE AND ACETAMINOPHEN 5; 325 MG/1; MG/1
1 TABLET ORAL EVERY 6 HOURS PRN
Qty: 60 TABLET | Refills: 0 | Status: SHIPPED | OUTPATIENT
Start: 2019-06-25 | End: 2019-07-22 | Stop reason: SDUPTHER

## 2019-06-25 NOTE — TELEPHONE ENCOUNTER
Selma, are you still seeing this patient? Do you know the plan for treatment of back pain? Is he still scheduled to see a surgeon?

## 2019-06-25 NOTE — TELEPHONE ENCOUNTER
Spoke with patient, states he is scheduled in August but will not have a specific date until closer to time. [facility] he is scheduled with Dr Steiner at

## 2019-06-25 NOTE — TELEPHONE ENCOUNTER
----- Message from Renetta Garcia [employer] sent at 6/25/2019  9:04 AM EDT -----  Contact: PT; YULIANA  REFILL    oxyCODONE-acetaminophen (PERCOCET) 5-325 MG per tablet     [facility]    PT: [phone]    PATIENT STATES HIS INSURANCE IS INACTIVE AND THAT'S WHY HE HASN'T BEEN IN TO BE SEEN HE SAID IT WOULD BE ABOUT A MONTH BEFORE IT GETS RE ACTIVATED.

## 2019-06-26 NOTE — TELEPHONE ENCOUNTER
Medication refilled. Must keep appt with surgeon, who will determine how to treat chronic back pain.

## 2019-07-22 ENCOUNTER — OFFICE VISIT (OUTPATIENT)
Dept: FAMILY MEDICINE CLINIC | Facility: CLINIC | Age: 29
End: 2019-07-22

## 2019-07-22 VITALS
TEMPERATURE: 96.4 F | DIASTOLIC BLOOD PRESSURE: 92 MMHG | BODY MASS INDEX: 35.15 KG/M2 | WEIGHT: 238 LBS | HEART RATE: 90 BPM | SYSTOLIC BLOOD PRESSURE: 138 MMHG | RESPIRATION RATE: 16 BRPM

## 2019-07-22 DIAGNOSIS — T84.84XA PAINFUL ORTHOPAEDIC HARDWARE (HCC): Primary | ICD-10-CM

## 2019-07-22 DIAGNOSIS — G89.29 CHRONIC BILATERAL LOW BACK PAIN WITH BILATERAL SCIATICA: ICD-10-CM

## 2019-07-22 DIAGNOSIS — M54.41 CHRONIC BILATERAL LOW BACK PAIN WITH BILATERAL SCIATICA: ICD-10-CM

## 2019-07-22 DIAGNOSIS — M54.42 CHRONIC BILATERAL LOW BACK PAIN WITH BILATERAL SCIATICA: ICD-10-CM

## 2019-07-22 PROCEDURE — 99213 OFFICE O/P EST LOW 20 MIN: CPT | Performed by: PHYSICIAN ASSISTANT

## 2019-07-22 RX ORDER — OXYCODONE HYDROCHLORIDE AND ACETAMINOPHEN 5; 325 MG/1; MG/1
1 TABLET ORAL EVERY 6 HOURS PRN
Qty: 60 TABLET | Refills: 0 | Status: SHIPPED | OUTPATIENT
Start: 2019-07-22 | End: 2019-08-22 | Stop reason: SDUPTHER

## 2019-07-22 NOTE — PROGRESS NOTES
Subjective   Sagar Iniguez is a 29 y.o. male.     History of Present Illness   F/U for chronic low back pain   Anxious for upcoming back surgery   August 29th pre-op  September 11, 2019 surgery date   Shims in spine- anterior approach   Not removing old cages due to calcium build up (worried about paralyzing)   Replacing hardware and fusing   Weight loss has improved mobility and ADLs   Still in pain     Mother is going to be helping with recovery     Needs two additional months of pain management as surgeon will not fill until after surgery   Note Hep C has completely resolved       The following portions of the patient's history were reviewed and updated as appropriate: allergies, current medications, past family history, past medical history, past social history, past surgical history and problem list.    Review of Systems   Constitutional: Negative.  Negative for chills, diaphoresis, fatigue and fever.   HENT: Negative.  Negative for congestion, ear discharge, ear pain, hearing loss, nosebleeds, postnasal drip, sinus pressure, sneezing and sore throat.    Eyes: Negative.    Respiratory: Negative.  Negative for cough, chest tightness, shortness of breath and wheezing.    Cardiovascular: Negative.  Negative for chest pain, palpitations and leg swelling.   Gastrointestinal: Negative for abdominal distention, abdominal pain, blood in stool, constipation, diarrhea, nausea and vomiting.   Genitourinary: Negative.  Negative for difficulty urinating, dysuria, flank pain, frequency, hematuria and urgency.   Musculoskeletal: Positive for back pain and gait problem. Negative for arthralgias, joint swelling, myalgias, neck pain and neck stiffness.   Skin: Negative.  Negative for color change, pallor, rash and wound.   Neurological: Negative for dizziness, syncope, weakness, light-headedness, numbness and headaches.       Objective    Blood pressure 138/92, pulse 90, temperature 96.4 °F (35.8 °C), temperature source Temporal,  resp. rate 16, weight 108 kg (238 lb).     Physical Exam   Constitutional: He is oriented to person, place, and time. He appears well-developed and well-nourished.   HENT:   Head: Normocephalic and atraumatic.   Right Ear: External ear normal.   Left Ear: External ear normal.   Nose: Nose normal.   Eyes: Conjunctivae are normal.   Cardiovascular: Normal rate, regular rhythm and normal heart sounds.   Pulmonary/Chest: Effort normal and breath sounds normal. No respiratory distress. He has no wheezes. He has no rales. He exhibits no tenderness.   Musculoskeletal: Normal range of motion. He exhibits no edema, tenderness or deformity.   Antalgic gait  Ambulating with cane    Neurological: He is alert and oriented to person, place, and time.   Skin: Skin is warm and dry.   Psychiatric: He has a normal mood and affect. His behavior is normal. Judgment and thought content normal.   Nursing note and vitals reviewed.      Assessment/Plan   Sagar was seen today for follow-up.    Diagnoses and all orders for this visit:    Painful orthopaedic hardware (CMS/HCC)    Chronic bilateral low back pain with bilateral sciatica    request for two additional months of pain management sent to doctor.   Pt aware if pain persists after surgery will likely need to get back in with pain management. Pt agrees.

## 2019-08-16 ENCOUNTER — TELEPHONE (OUTPATIENT)
Dept: GASTROENTEROLOGY | Facility: CLINIC | Age: 29
End: 2019-08-16

## 2019-08-16 NOTE — TELEPHONE ENCOUNTER
Called patient regarding no show appointment on 8/14/19 at 11:30am. No answer; no voice mail set up. Not accepting calls. I will mail no show letter.

## 2019-08-22 ENCOUNTER — TELEPHONE (OUTPATIENT)
Dept: FAMILY MEDICINE CLINIC | Facility: CLINIC | Age: 29
End: 2019-08-22

## 2019-08-22 RX ORDER — OXYCODONE HYDROCHLORIDE AND ACETAMINOPHEN 5; 325 MG/1; MG/1
1 TABLET ORAL EVERY 6 HOURS PRN
Qty: 60 TABLET | Refills: 0 | Status: SHIPPED | OUTPATIENT
Start: 2019-08-22 | End: 2019-09-18

## 2019-09-16 ENCOUNTER — TELEPHONE (OUTPATIENT)
Dept: FAMILY MEDICINE CLINIC | Facility: CLINIC | Age: 29
End: 2019-09-16

## 2019-09-16 NOTE — TELEPHONE ENCOUNTER
Tried to reach pt's mother at the number she left on the message and it say's, the wireless caller is unavailable. No voicemail.

## 2019-09-16 NOTE — TELEPHONE ENCOUNTER
----- Message from Olga Yin sent at 9/16/2019  9:37 AM EDT -----  Contact: NOHEMY; MERLE CARDONA CALLED  REQUESTING A CALL BACK FROM NOHEMY REGARDING [facility]-HE IS IN THE    HOSPITAL AND BEING RELEASED AND MOM WANTS TO DISCUSS WHEN  AND IF HE NEEDS TO RETURN    [phone]

## 2019-09-16 NOTE — TELEPHONE ENCOUNTER
Patient will likely just need to follow up with orthopedic surgeon after his surgery. Pt should have been advised by [facility] when to follow up with them.  Pt aware pain management will need to be managed by surgeon's office or we will need to get him set up with pain management again if needed. Call if any concerns. LEÓN

## 2019-09-17 ENCOUNTER — TELEPHONE (OUTPATIENT)
Dept: FAMILY MEDICINE CLINIC | Facility: CLINIC | Age: 29
End: 2019-09-17

## 2019-09-18 ENCOUNTER — OFFICE VISIT (OUTPATIENT)
Dept: FAMILY MEDICINE CLINIC | Facility: CLINIC | Age: 29
End: 2019-09-18

## 2019-09-18 VITALS
HEIGHT: 69 IN | SYSTOLIC BLOOD PRESSURE: 170 MMHG | HEART RATE: 98 BPM | RESPIRATION RATE: 18 BRPM | BODY MASS INDEX: 35.15 KG/M2 | TEMPERATURE: 98.7 F | DIASTOLIC BLOOD PRESSURE: 60 MMHG

## 2019-09-18 DIAGNOSIS — K29.00 OTHER ACUTE GASTRITIS, PRESENCE OF BLEEDING UNSPECIFIED: ICD-10-CM

## 2019-09-18 DIAGNOSIS — Z98.890 RECENT MAJOR SURGERY: ICD-10-CM

## 2019-09-18 DIAGNOSIS — D64.9 LOW HEMOGLOBIN AND LOW HEMATOCRIT: Primary | ICD-10-CM

## 2019-09-18 DIAGNOSIS — N50.89 SCROTAL SWELLING: ICD-10-CM

## 2019-09-18 DIAGNOSIS — R03.0 ELEVATED BLOOD PRESSURE READING: ICD-10-CM

## 2019-09-18 PROCEDURE — 99214 OFFICE O/P EST MOD 30 MIN: CPT | Performed by: PHYSICIAN ASSISTANT

## 2019-09-18 RX ORDER — TRAMADOL HYDROCHLORIDE 50 MG/1
50 TABLET ORAL EVERY 8 HOURS PRN
Refills: 0 | COMMUNITY
Start: 2019-09-16 | End: 2019-11-06

## 2019-09-18 RX ORDER — OXYCODONE HYDROCHLORIDE 5 MG/1
TABLET ORAL
Refills: 0 | COMMUNITY
Start: 2019-09-16 | End: 2019-11-06

## 2019-09-18 RX ORDER — IBUPROFEN 400 MG/1
TABLET ORAL
COMMUNITY
Start: 2019-09-16 | End: 2020-01-13

## 2019-09-18 RX ORDER — CYCLOBENZAPRINE HCL 5 MG
TABLET ORAL
COMMUNITY
Start: 2019-09-16 | End: 2019-11-06

## 2019-09-18 RX ORDER — OMEPRAZOLE 20 MG/1
20 CAPSULE, DELAYED RELEASE ORAL DAILY
Qty: 30 CAPSULE | Refills: 0 | Status: SHIPPED | OUTPATIENT
Start: 2019-09-18

## 2019-09-18 NOTE — PROGRESS NOTES
Subjective   Sagar Iniguez is a 29 y.o. male.     History of Present Illness   Pt presents for follow up from [facility] following extensive back surgery approximately one week ago. Records still pending and patient has no papers with him during office visit. Accompanied by his mother. Pt reports he had fusion from thoracic spine to pelvis. Has sutures along back and front of abdomen. Wearing turtle shell brace. Ambulating with walker. Following surgery patient reports his blood counts acutely dropped and had to have blood products. Nothing to the severity where they opened him back up, however patient is concerned about internal bleeding. While still in hospital he reports hematuria. Has not had any since.   Reports that following hospitalization he also had scrotal swelling and pain. This has improved today. Trying to elevate.   Has been able to have normal bowel movements since surgery. No bloody bowel movements. No abdominal distension. Pain around surgical site     Blood pressure noted to be up in office. 170/60. Pt reports he is in severe pain. Medications from surgeon's office unfortunately have not been able to control pain well     Pt reports he just does not feel well. Pale, sweaty/clammy. No syncopal episodes.     Trying to eat more protein. Has been drinking plenty of fluids.     Redness around abdominal sutures. Steri strips in place, noticed drainage underneath them. Not covering them, but they have been rubbing with turtle shell brace. Has called surgeon's office and left them message about his concerns, but has not been called back     Next scheduled follow up with surgeon is 9/30/19.     Pt requesting something to help with upset stomach from medications. Having heartburn as well     Patient notes despite his level of pain, he is happy that his nerve pain in his leg has resolved following surgery.     The following portions of the patient's history were reviewed and updated as appropriate: allergies,  "current medications, past family history, past medical history, past social history, past surgical history and problem list.    Review of Systems   Constitutional: Positive for diaphoresis. Negative for chills, fatigue and fever.   HENT: Negative.  Negative for congestion, ear discharge, ear pain, hearing loss, nosebleeds, postnasal drip, sinus pressure, sneezing and sore throat.    Eyes: Negative.    Respiratory: Negative.  Negative for cough, chest tightness, shortness of breath and wheezing.    Cardiovascular: Negative.  Negative for chest pain, palpitations and leg swelling.   Gastrointestinal: Positive for abdominal pain. Negative for abdominal distention, blood in stool, constipation, diarrhea, nausea and vomiting.        Upset stomach, heartburn    Genitourinary: Negative.  Negative for difficulty urinating, dysuria, flank pain, frequency, hematuria and urgency.   Musculoskeletal: Positive for back pain.   Skin: Positive for pallor and wound. Negative for rash.   Neurological: Negative for dizziness, syncope, weakness, light-headedness, numbness and headaches.       Objective    Blood pressure 170/60, pulse 98, temperature 98.7 °F (37.1 °C), resp. rate 18, height 175.3 cm (69\").     Physical Exam   Constitutional: He is oriented to person, place, and time. He appears well-developed and well-nourished. He appears distressed.   Patient appears uncomfortable and in pain    HENT:   Head: Normocephalic and atraumatic.   Right Ear: External ear normal.   Left Ear: External ear normal.   Nose: Nose normal.   Mouth/Throat: Oropharynx is clear and moist.   Eyes: Conjunctivae are normal.   Cardiovascular: Normal rate.   Can not assess fully due to turtle shell brace   Pulmonary/Chest: Effort normal.   Can not assess fully due to turtle shell brace   Abdominal: Soft. He exhibits no distension.   Genitourinary:   Genitourinary Comments: Mild swelling of scrotum bilaterally    Musculoskeletal:   Ambulating with walker "   Antalgic gait   Wearing turtle shell brace    Neurological: He is alert and oriented to person, place, and time.   Skin: Skin is warm. He is diaphoretic. There is pallor.   Vertical sutures along left side of abdomen. Steri strips in place with some brown drainage stuck to adhesive. No active drainage through strips.   Surrounding erythema with some inflammation. No streaking    Psychiatric: He has a normal mood and affect. His behavior is normal. Judgment and thought content normal.   Nursing note and vitals reviewed.      Assessment/Plan   Sagar was seen today for [facility] f/u.    Diagnoses and all orders for this visit:    Low hemoglobin and low hematocrit  -     CBC & Differential  -     Iron Profile    Recent major surgery  -     Comprehensive metabolic panel    Other acute gastritis, presence of bleeding unspecified  -     omeprazole (PRILOSEC) 20 MG capsule; Take 1 capsule by mouth Daily.    Elevated blood pressure reading    Scrotal swelling      Recheck hemoglobin and hematocrit today   Keep scrotum elevated, if swelling or pain worsens again can proceed with imaging if warranted (concern for internal bleed or acute scrotal issue)   Omeprazole as directed for stomach upset   Abdomen is not firm or distended. Amount of erythema around surgical site likely normal and does not appear infected at this time   Monitor blood pressure at home, elevated systolic value likely secondary to significant pain. Diastolic  was a little low. Make sure to continue staying well hydrated     Due to patient's current complaints and symptoms after major surgery, surgeon's office was contacted. MA spoke with nurse who will contact patient to discuss if earlier follow up is warranted  Will fax Dr. Steiner's office when labs become available and surgical records requested    Pt instructed to report to ER immediately if new or worsening symptoms develop. Pt states he really does not want to go back to the hospital, however him and  his mother agree.     Keep scheduled f.u appointments     Instructed patient to follow up with surgeon about level of pain, aware our office can not prescribe anything for this following his recent surgery.

## 2019-09-19 LAB
ALBUMIN SERPL-MCNC: 4.2 G/DL (ref 3.5–5.2)
ALBUMIN/GLOB SERPL: 1.7 G/DL
ALP SERPL-CCNC: 144 U/L (ref 39–117)
ALT SERPL-CCNC: 29 U/L (ref 1–41)
AST SERPL-CCNC: 21 U/L (ref 1–40)
BASOPHILS # BLD AUTO: 0.06 10*3/MM3 (ref 0–0.2)
BASOPHILS NFR BLD AUTO: 0.5 % (ref 0–1.5)
BILIRUB SERPL-MCNC: 0.7 MG/DL (ref 0.2–1.2)
BUN SERPL-MCNC: 11 MG/DL (ref 6–20)
BUN/CREAT SERPL: 16.2 (ref 7–25)
CALCIUM SERPL-MCNC: 9 MG/DL (ref 8.6–10.5)
CHLORIDE SERPL-SCNC: 97 MMOL/L (ref 98–107)
CO2 SERPL-SCNC: 25.6 MMOL/L (ref 22–29)
CREAT SERPL-MCNC: 0.68 MG/DL (ref 0.76–1.27)
EOSINOPHIL # BLD AUTO: 0.49 10*3/MM3 (ref 0–0.4)
EOSINOPHIL NFR BLD AUTO: 4 % (ref 0.3–6.2)
ERYTHROCYTE [DISTWIDTH] IN BLOOD BY AUTOMATED COUNT: 14.8 % (ref 12.3–15.4)
GLOBULIN SER CALC-MCNC: 2.5 GM/DL
GLUCOSE SERPL-MCNC: 89 MG/DL (ref 65–99)
HCT VFR BLD AUTO: 24.1 % (ref 37.5–51)
HGB BLD-MCNC: 8 G/DL (ref 13–17.7)
IMM GRANULOCYTES # BLD AUTO: 0.36 10*3/MM3 (ref 0–0.05)
IMM GRANULOCYTES NFR BLD AUTO: 2.9 % (ref 0–0.5)
IRON SATN MFR SERPL: 15 % (ref 20–50)
IRON SERPL-MCNC: 70 MCG/DL (ref 59–158)
LYMPHOCYTES # BLD AUTO: 2.91 10*3/MM3 (ref 0.7–3.1)
LYMPHOCYTES NFR BLD AUTO: 23.8 % (ref 19.6–45.3)
MCH RBC QN AUTO: 29.2 PG (ref 26.6–33)
MCHC RBC AUTO-ENTMCNC: 33.2 G/DL (ref 31.5–35.7)
MCV RBC AUTO: 88 FL (ref 79–97)
MONOCYTES # BLD AUTO: 0.77 10*3/MM3 (ref 0.1–0.9)
MONOCYTES NFR BLD AUTO: 6.3 % (ref 5–12)
NEUTROPHILS # BLD AUTO: 7.62 10*3/MM3 (ref 1.7–7)
NEUTROPHILS NFR BLD AUTO: 62.5 % (ref 42.7–76)
NRBC BLD AUTO-RTO: 0.2 /100 WBC (ref 0–0.2)
PLATELET # BLD AUTO: 425 10*3/MM3 (ref 140–450)
POTASSIUM SERPL-SCNC: 4.7 MMOL/L (ref 3.5–5.2)
PROT SERPL-MCNC: 6.7 G/DL (ref 6–8.5)
RBC # BLD AUTO: 2.74 10*6/MM3 (ref 4.14–5.8)
SODIUM SERPL-SCNC: 137 MMOL/L (ref 136–145)
TIBC SERPL-MCNC: 469 MCG/DL
UIBC SERPL-MCNC: 399 MCG/DL (ref 112–346)
WBC # BLD AUTO: 12.21 10*3/MM3 (ref 3.4–10.8)

## 2019-09-24 ENCOUNTER — TELEPHONE (OUTPATIENT)
Dept: FAMILY MEDICINE CLINIC | Facility: CLINIC | Age: 29
End: 2019-09-24

## 2019-09-24 DIAGNOSIS — N50.819 TESTICULAR PAIN: ICD-10-CM

## 2019-09-24 DIAGNOSIS — N50.89 SCROTAL SWELLING: Primary | ICD-10-CM

## 2019-11-06 ENCOUNTER — OFFICE VISIT (OUTPATIENT)
Dept: FAMILY MEDICINE CLINIC | Facility: CLINIC | Age: 29
End: 2019-11-06

## 2019-11-06 VITALS
HEIGHT: 69 IN | RESPIRATION RATE: 18 BRPM | WEIGHT: 239 LBS | DIASTOLIC BLOOD PRESSURE: 92 MMHG | SYSTOLIC BLOOD PRESSURE: 150 MMHG | BODY MASS INDEX: 35.4 KG/M2 | HEART RATE: 88 BPM | TEMPERATURE: 97.8 F

## 2019-11-06 DIAGNOSIS — D64.9 LOW HEMOGLOBIN AND LOW HEMATOCRIT: Primary | ICD-10-CM

## 2019-11-06 PROCEDURE — 99213 OFFICE O/P EST LOW 20 MIN: CPT | Performed by: PHYSICIAN ASSISTANT

## 2019-11-06 RX ORDER — PREGABALIN 75 MG/1
CAPSULE ORAL
COMMUNITY
Start: 2019-10-24

## 2019-11-06 NOTE — PROGRESS NOTES
"Subjective   Sagar Iniguez is a 29 y.o. male.     History of Present Illness   Pt presents to recheck  low blood counts following back surgery in September. States his coloring was off several days after last appointment and knows he should have gone to the ER but didn't. Feeling better now. Also notes testicular swelling after surgery has resolved.   Still wearing back brace. Has been advised not to start therapy until after his next surgery follow up  Waiting on a \"bone stimulator\"    Has been following up with Surgeon Dr. Steiner. They have him on Lyrica for neuropathic pain at most recent visit on 10/24.         The following portions of the patient's history were reviewed and updated as appropriate: allergies, current medications, past family history, past medical history, past social history, past surgical history and problem list.    Review of Systems   Constitutional: Positive for fatigue. Negative for chills, diaphoresis and fever.   HENT: Negative.  Negative for congestion, ear discharge, ear pain, hearing loss, nosebleeds, postnasal drip, sinus pressure, sneezing and sore throat.    Eyes: Negative.    Respiratory: Negative.  Negative for cough, chest tightness, shortness of breath and wheezing.    Cardiovascular: Negative.  Negative for chest pain, palpitations and leg swelling.   Gastrointestinal: Negative for abdominal distention, abdominal pain, blood in stool, constipation, diarrhea, nausea and vomiting.   Genitourinary: Negative.  Negative for difficulty urinating, dysuria, flank pain, frequency, hematuria and urgency.   Musculoskeletal: Positive for back pain and gait problem. Negative for arthralgias, joint swelling, myalgias, neck pain and neck stiffness.   Skin: Negative.  Negative for color change, pallor, rash and wound.   Neurological: Negative for dizziness, syncope, weakness, light-headedness, numbness and headaches.       Objective    Blood pressure 150/92, pulse 88, temperature 97.8 °F (36.6 " "°C), resp. rate 18, height 175.3 cm (69\"), weight 108 kg (239 lb).     Physical Exam   Constitutional: He is oriented to person, place, and time. He appears well-developed and well-nourished.   HENT:   Head: Normocephalic and atraumatic.   Right Ear: External ear normal.   Left Ear: External ear normal.   Nose: Nose normal.   Mouth/Throat: Oropharynx is clear and moist. No oropharyngeal exudate.   Eyes: Conjunctivae are normal.   Neck: Normal range of motion. Neck supple. No tracheal deviation present. No thyromegaly present.   Cardiovascular: Normal rate, regular rhythm, normal heart sounds and intact distal pulses.   Pulmonary/Chest: Effort normal and breath sounds normal. No respiratory distress. He has no wheezes. He has no rales. He exhibits no tenderness.   Musculoskeletal:   Ambulating with walker   Wearing hard shell back brace    Lymphadenopathy:     He has no cervical adenopathy.   Neurological: He is alert and oriented to person, place, and time.   Skin: Skin is warm and dry.   Psychiatric: He has a normal mood and affect. His behavior is normal. Judgment and thought content normal.   Nursing note and vitals reviewed.      Assessment/Plan   Diagnoses and all orders for this visit:    Low hemoglobin and low hematocrit  -     CBC & Differential  -     Iron Profile  -     Ferritin  -     Vitamin B12      Check labs as outlined in plan     Pt requesting pain medication. Our office has not filled this for him since before his surgery in August. Chalo Salter prescriber [id number] has prescribed pain management on 9/16/19, 9/23, and 10/3 for oxycodone 5 mg and tramadol 50 mg (7 day supply for oxy 10/3 and 21 day supply for tramadol in September). Called pharmacy and prescriber was Gage Ingram MD (resident with sports medicine at ). Encourage patient discuss pain management with his surgeon. He is aware I have not been prescribing this medication from our office (Dr. Steve) has approved fills prior to surgery " due to severity of pain and nature of back issues. She is no longer comfortable doing this. May try seeing MD/DO at our office for visit, however pain management specialty encouraged. (patient does not wish to go to pain management, hoping he will be better in 2 months where he wont need any pain meds)

## 2019-11-07 DIAGNOSIS — E61.1 IRON DEFICIENCY: ICD-10-CM

## 2019-11-07 DIAGNOSIS — E53.8 VITAMIN B12 DEFICIENCY: Primary | ICD-10-CM

## 2019-11-07 LAB
BASOPHILS # BLD AUTO: 0.06 10*3/MM3 (ref 0–0.2)
BASOPHILS NFR BLD AUTO: 0.7 % (ref 0–1.5)
EOSINOPHIL # BLD AUTO: 0.4 10*3/MM3 (ref 0–0.4)
EOSINOPHIL NFR BLD AUTO: 4.4 % (ref 0.3–6.2)
ERYTHROCYTE [DISTWIDTH] IN BLOOD BY AUTOMATED COUNT: 15.1 % (ref 12.3–15.4)
FERRITIN SERPL-MCNC: 13.3 NG/ML (ref 30–400)
HCT VFR BLD AUTO: 39.1 % (ref 37.5–51)
HGB BLD-MCNC: 12.1 G/DL (ref 13–17.7)
IMM GRANULOCYTES # BLD AUTO: 0.03 10*3/MM3 (ref 0–0.05)
IMM GRANULOCYTES NFR BLD AUTO: 0.3 % (ref 0–0.5)
IRON SATN MFR SERPL: 6 % (ref 20–50)
IRON SERPL-MCNC: 35 MCG/DL (ref 59–158)
LYMPHOCYTES # BLD AUTO: 2.05 10*3/MM3 (ref 0.7–3.1)
LYMPHOCYTES NFR BLD AUTO: 22.3 % (ref 19.6–45.3)
MCH RBC QN AUTO: 24.6 PG (ref 26.6–33)
MCHC RBC AUTO-ENTMCNC: 30.9 G/DL (ref 31.5–35.7)
MCV RBC AUTO: 79.5 FL (ref 79–97)
MONOCYTES # BLD AUTO: 0.44 10*3/MM3 (ref 0.1–0.9)
MONOCYTES NFR BLD AUTO: 4.8 % (ref 5–12)
NEUTROPHILS # BLD AUTO: 6.2 10*3/MM3 (ref 1.7–7)
NEUTROPHILS NFR BLD AUTO: 67.5 % (ref 42.7–76)
NRBC BLD AUTO-RTO: 0 /100 WBC (ref 0–0.2)
PLATELET # BLD AUTO: 324 10*3/MM3 (ref 140–450)
RBC # BLD AUTO: 4.92 10*6/MM3 (ref 4.14–5.8)
TIBC SERPL-MCNC: 553 MCG/DL
UIBC SERPL-MCNC: 518 MCG/DL (ref 112–346)
VIT B12 SERPL-MCNC: 209 PG/ML (ref 211–946)
WBC # BLD AUTO: 9.18 10*3/MM3 (ref 3.4–10.8)

## 2019-11-07 RX ORDER — FERROUS SULFATE 325(65) MG
325 TABLET ORAL
Qty: 30 TABLET | Refills: 2 | Status: SHIPPED | OUTPATIENT
Start: 2019-11-07

## 2019-11-07 RX ORDER — LANOLIN ALCOHOL/MO/W.PET/CERES
1000 CREAM (GRAM) TOPICAL DAILY
Qty: 30 TABLET | Refills: 2 | Status: SHIPPED | OUTPATIENT
Start: 2019-11-07

## 2020-01-13 ENCOUNTER — OFFICE VISIT (OUTPATIENT)
Dept: FAMILY MEDICINE CLINIC | Facility: CLINIC | Age: 30
End: 2020-01-13

## 2020-01-13 VITALS
HEART RATE: 76 BPM | BODY MASS INDEX: 33.24 KG/M2 | RESPIRATION RATE: 18 BRPM | SYSTOLIC BLOOD PRESSURE: 124 MMHG | WEIGHT: 224.4 LBS | HEIGHT: 69 IN | DIASTOLIC BLOOD PRESSURE: 82 MMHG | TEMPERATURE: 97.2 F

## 2020-01-13 DIAGNOSIS — E61.1 IRON DEFICIENCY: ICD-10-CM

## 2020-01-13 DIAGNOSIS — R22.41 MASS OF RIGHT THIGH: ICD-10-CM

## 2020-01-13 DIAGNOSIS — E53.8 VITAMIN B12 DEFICIENCY: Primary | ICD-10-CM

## 2020-01-13 DIAGNOSIS — E55.9 VITAMIN D DEFICIENCY: ICD-10-CM

## 2020-01-13 PROCEDURE — 99214 OFFICE O/P EST MOD 30 MIN: CPT | Performed by: PHYSICIAN ASSISTANT

## 2020-01-13 RX ORDER — CYCLOBENZAPRINE HCL 5 MG
5 TABLET ORAL 3 TIMES DAILY
COMMUNITY
Start: 2020-01-03

## 2020-01-13 NOTE — PROGRESS NOTES
Subjective   Sagar Iniguez is a 29 y.o. male.     History of Present Illness   3 month follow up for iron and b12 deficiency. Has been taking oral supplements. Due for recheck today. Iron has upset stomach some     New foot and ankle specialist. Does not like who he saw in [place]   1/29/19 appointment. Still waiting on leg braces     Knot right upper thigh, close to surface. Had since teenager. Over last several months has seemed to get bigger and is more tender.     Waiting on rehab until he can get brace for ankle settled     Dr. Steiner still has him on Lyrica    Taking flexeril when needed     Easier to take a shower and do dishes. Feeling better since surgery   Back feels like it has been popping.   Uses two canes now. Was hurting putting all weight on one side.   Uses walker in house.         The following portions of the patient's history were reviewed and updated as appropriate: allergies, current medications, past family history, past medical history, past social history, past surgical history and problem list.    Review of Systems   Constitutional: Negative.  Negative for chills, diaphoresis, fatigue and fever.   HENT: Negative.  Negative for congestion, ear discharge, ear pain, hearing loss, nosebleeds, postnasal drip, sinus pressure, sneezing and sore throat.    Eyes: Negative.    Respiratory: Negative.  Negative for cough, chest tightness, shortness of breath and wheezing.    Cardiovascular: Negative.  Negative for chest pain, palpitations and leg swelling.   Gastrointestinal: Negative for abdominal distention, abdominal pain, blood in stool, constipation, diarrhea, nausea and vomiting.   Genitourinary: Negative.  Negative for difficulty urinating, dysuria, flank pain, frequency, hematuria and urgency.   Musculoskeletal: Positive for arthralgias, back pain, gait problem and myalgias. Negative for joint swelling, neck pain and neck stiffness.   Skin: Negative for color change, pallor, rash and wound.  "       As noted per HPI   Neurological: Negative for dizziness, syncope, weakness, light-headedness, numbness and headaches.       Objective    Blood pressure 124/82, pulse 76, temperature 97.2 °F (36.2 °C), resp. rate 18, height 175.3 cm (69\"), weight 102 kg (224 lb 6.4 oz).     Physical Exam   Constitutional: He is oriented to person, place, and time. He appears well-developed and well-nourished.   HENT:   Head: Normocephalic and atraumatic.   Right Ear: External ear normal.   Left Ear: External ear normal.   Nose: Nose normal.   Eyes: Conjunctivae are normal.   Cardiovascular: Normal rate, regular rhythm and normal heart sounds.   Pulmonary/Chest: Effort normal and breath sounds normal. No respiratory distress. He has no wheezes. He has no rales. He exhibits no tenderness.   Musculoskeletal:   Ambulating with two canes.     Palpable firm, slightly mobile subcutaneous mass several millimeters in length along upper R lateral thigh. No skin changes. Area is TTP   Neurological: He is alert and oriented to person, place, and time.   Skin: Skin is warm and dry.   Psychiatric: He has a normal mood and affect. His behavior is normal. Judgment and thought content normal.   Nursing note and vitals reviewed.      Assessment/Plan   Sagar was seen today for low hemoglobin and low hematocrit.    Diagnoses and all orders for this visit:    Vitamin B12 deficiency  -     CBC & Differential  -     Comprehensive Metabolic Panel  -     Vitamin B12    Vitamin D deficiency  -     Vitamin D 25 Hydroxy    Iron deficiency  -     CBC & Differential  -     Comprehensive Metabolic Panel  -     Iron Profile  -     Ferritin    Mass of right thigh  -     CT lower extremity right w contrast    labs as outlined in plan. Will proceed with CT scan to further evaluate mass in thigh            "

## 2020-01-14 LAB
25(OH)D3+25(OH)D2 SERPL-MCNC: 22.7 NG/ML (ref 30–100)
ALBUMIN SERPL-MCNC: 4.7 G/DL (ref 3.5–5.2)
ALBUMIN/GLOB SERPL: 1.7 G/DL
ALP SERPL-CCNC: 111 U/L (ref 39–117)
ALT SERPL-CCNC: 38 U/L (ref 1–41)
AST SERPL-CCNC: 23 U/L (ref 1–40)
BASOPHILS # BLD AUTO: 0.06 10*3/MM3 (ref 0–0.2)
BASOPHILS NFR BLD AUTO: 0.5 % (ref 0–1.5)
BILIRUB SERPL-MCNC: 0.3 MG/DL (ref 0.2–1.2)
BUN SERPL-MCNC: 10 MG/DL (ref 6–20)
BUN/CREAT SERPL: 14.7 (ref 7–25)
CALCIUM SERPL-MCNC: 9.7 MG/DL (ref 8.6–10.5)
CHLORIDE SERPL-SCNC: 97 MMOL/L (ref 98–107)
CO2 SERPL-SCNC: 28.2 MMOL/L (ref 22–29)
CREAT SERPL-MCNC: 0.68 MG/DL (ref 0.76–1.27)
EOSINOPHIL # BLD AUTO: 0.32 10*3/MM3 (ref 0–0.4)
EOSINOPHIL NFR BLD AUTO: 2.8 % (ref 0.3–6.2)
ERYTHROCYTE [DISTWIDTH] IN BLOOD BY AUTOMATED COUNT: 17.5 % (ref 12.3–15.4)
FERRITIN SERPL-MCNC: 67.8 NG/ML (ref 30–400)
GLOBULIN SER CALC-MCNC: 2.7 GM/DL
GLUCOSE SERPL-MCNC: 97 MG/DL (ref 65–99)
HCT VFR BLD AUTO: 41.7 % (ref 37.5–51)
HGB BLD-MCNC: 14.4 G/DL (ref 13–17.7)
IMM GRANULOCYTES # BLD AUTO: 0.05 10*3/MM3 (ref 0–0.05)
IMM GRANULOCYTES NFR BLD AUTO: 0.4 % (ref 0–0.5)
IRON SATN MFR SERPL: 17 % (ref 20–50)
IRON SERPL-MCNC: 81 MCG/DL (ref 59–158)
LYMPHOCYTES # BLD AUTO: 2.13 10*3/MM3 (ref 0.7–3.1)
LYMPHOCYTES NFR BLD AUTO: 18.6 % (ref 19.6–45.3)
MCH RBC QN AUTO: 27.5 PG (ref 26.6–33)
MCHC RBC AUTO-ENTMCNC: 34.5 G/DL (ref 31.5–35.7)
MCV RBC AUTO: 79.6 FL (ref 79–97)
MONOCYTES # BLD AUTO: 0.59 10*3/MM3 (ref 0.1–0.9)
MONOCYTES NFR BLD AUTO: 5.1 % (ref 5–12)
NEUTROPHILS # BLD AUTO: 8.33 10*3/MM3 (ref 1.7–7)
NEUTROPHILS NFR BLD AUTO: 72.6 % (ref 42.7–76)
NRBC BLD AUTO-RTO: 0 /100 WBC (ref 0–0.2)
PLATELET # BLD AUTO: 250 10*3/MM3 (ref 140–450)
POTASSIUM SERPL-SCNC: 4.7 MMOL/L (ref 3.5–5.2)
PROT SERPL-MCNC: 7.4 G/DL (ref 6–8.5)
RBC # BLD AUTO: 5.24 10*6/MM3 (ref 4.14–5.8)
SODIUM SERPL-SCNC: 139 MMOL/L (ref 136–145)
TIBC SERPL-MCNC: 470 MCG/DL
UIBC SERPL-MCNC: 389 MCG/DL (ref 112–346)
VIT B12 SERPL-MCNC: 515 PG/ML (ref 211–946)
WBC # BLD AUTO: 11.48 10*3/MM3 (ref 3.4–10.8)

## 2020-02-10 ENCOUNTER — HOSPITAL ENCOUNTER (OUTPATIENT)
Dept: CT IMAGING | Facility: HOSPITAL | Age: 30
Discharge: HOME | End: 2020-02-10
Admitting: PHYSICIAN ASSISTANT

## 2020-02-10 PROCEDURE — 25010000002 IOPAMIDOL 61 % SOLUTION: Performed by: PHYSICIAN ASSISTANT

## 2020-02-10 PROCEDURE — 73701 CT LOWER EXTREMITY W/DYE: CPT

## 2020-02-10 RX ADMIN — IOPAMIDOL 95 ML: 612 INJECTION, SOLUTION INTRAVENOUS at 10:46

## 2020-04-07 ENCOUNTER — TELEPHONE (OUTPATIENT)
Dept: FAMILY MEDICINE CLINIC | Facility: CLINIC | Age: 30
End: 2020-04-07

## 2020-04-07 NOTE — TELEPHONE ENCOUNTER
PATIENT CALLED AND IS REQUESTING OFFICE TO FAX HIS INSURANCE INFORMATION TO [facility] [place]. HE SAID THAT THEY'RE GIVING HIM TROUBLE SO THEY'RE NEEDING IT TO COME FROM OFFICE DIRECTLY. PLEASE ADVISE.    PATIENT CALLBACK # [phone]    [facility] [place] FAX # [phone]

## 2023-12-01 ENCOUNTER — OFFICE VISIT (OUTPATIENT)
Dept: FAMILY MEDICINE CLINIC | Facility: CLINIC | Age: 33
End: 2023-12-01
Payer: MEDICAID

## 2023-12-01 VITALS
DIASTOLIC BLOOD PRESSURE: 90 MMHG | OXYGEN SATURATION: 97 % | BODY MASS INDEX: 38.21 KG/M2 | HEART RATE: 92 BPM | WEIGHT: 258 LBS | SYSTOLIC BLOOD PRESSURE: 132 MMHG | HEIGHT: 69 IN

## 2023-12-01 DIAGNOSIS — H69.93 EUSTACHIAN TUBE DISORDER, BILATERAL: ICD-10-CM

## 2023-12-01 DIAGNOSIS — H66.001 NON-RECURRENT ACUTE SUPPURATIVE OTITIS MEDIA OF RIGHT EAR WITHOUT SPONTANEOUS RUPTURE OF TYMPANIC MEMBRANE: Primary | ICD-10-CM

## 2023-12-01 PROCEDURE — 99203 OFFICE O/P NEW LOW 30 MIN: CPT | Performed by: PHYSICIAN ASSISTANT

## 2023-12-01 PROCEDURE — 1159F MED LIST DOCD IN RCRD: CPT | Performed by: PHYSICIAN ASSISTANT

## 2023-12-01 PROCEDURE — 1160F RVW MEDS BY RX/DR IN RCRD: CPT | Performed by: PHYSICIAN ASSISTANT

## 2023-12-01 RX ORDER — FLUTICASONE PROPIONATE 50 MCG
2 SPRAY, SUSPENSION (ML) NASAL DAILY
Qty: 18.2 ML | Refills: 1 | Status: SHIPPED | OUTPATIENT
Start: 2023-12-01

## 2023-12-01 RX ORDER — CHLORCYCLIZINE HYDROCHLORIDE AND PSEUDOEPHEDRINE HYDROCHLORIDE 25; 60 MG/1; MG/1
1 TABLET ORAL 2 TIMES DAILY
Qty: 30 TABLET | Refills: 0 | Status: SHIPPED | OUTPATIENT
Start: 2023-12-01

## 2023-12-01 RX ORDER — AMOXICILLIN AND CLAVULANATE POTASSIUM 875; 125 MG/1; MG/1
1 TABLET, FILM COATED ORAL 2 TIMES DAILY
Qty: 20 TABLET | Refills: 0 | Status: SHIPPED | OUTPATIENT
Start: 2023-12-01

## 2023-12-01 NOTE — PROGRESS NOTES
New Patient Office Visit      Date: 2023   Patient Name: Ilsa Veras  : 1990   MRN: [id number]     Chief Complaint:    Chief Complaint   Patient presents with    Ear Fullness     Popping and cracking for a couple of months       History of Present Illness: Ilsa Veras is a 33 y.o. male who is here today to establish care.      ILSA VERAS his YOB: 1990, he is a 33-year-old male who comes in today to get established and having some ear problems.    His ear problems started a few months ago. It sounded like water in them. He thought it was allergies because his sinuses were still messed up. About 5 or 6 days ago, he had a lot of pain. He has ringing in both ears. He can barely hear people talking. The pain is more on the right side. He can not hear out of the left ear. They pop and crack him a little. It is muffled. He notes it feels like he has water in his ears. He denies any discharge. He has congestion. He denies any sore throat. He has a cough. He denies any drainage down his throat. His neck and head were hurting the other day. He was dizzy the other day and had a fever. He has been using a nasal rinse. He is not taking any medications.    He had back surgery in 2019.    Subjective      Review of Systems:   Review of Systems   Constitutional:  Negative for fatigue and fever.   HENT:  Negative for trouble swallowing.    Eyes:  Negative for visual disturbance.   Respiratory:  Negative for cough and shortness of breath.    Cardiovascular:  Negative for chest pain and leg swelling.   Gastrointestinal:  Negative for abdominal pain.        Past Medical History:   Past Medical History:   Diagnosis Date    Infectious viral hepatitis     HEP C       Past Surgical History: History reviewed. No pertinent surgical history.    Family History:   Family History   Problem Relation Age of Onset    Lumbar disc disease Mother     Diabetes Mother     Diabetes Maternal Grandmother     Heart disease  Maternal Grandmother        Social History:   Social History     Socioeconomic History    Marital status: Single   Tobacco Use    Smoking status: Every Day     Packs/day: 1.00     Years: 11.00     Additional pack years: 0.00     Total pack years: 11.00     Types: Cigarettes     Start date: 3/31/2006     Passive exposure: Current    Smokeless tobacco: Former   Vaping Use    Vaping Use: Never used   Substance and Sexual Activity    Alcohol use: Yes     Comment: occasionally; socially only    Drug use: Never    Sexual activity: Defer       Medications:     Current Outpatient Medications:     amoxicillin-clavulanate (AUGMENTIN) 875-125 MG per tablet, Take 1 tablet by mouth 2 (Two) Times a Day., Disp: 20 tablet, Rfl: 0    Chlorcyclizine-Pseudoephed (Stahist AD) 25-60 MG tablet, Take 1 tablet by mouth 2 (Two) Times a Day., Disp: 30 tablet, Rfl: 0    cholecalciferol (VITAMIN D3) 1000 units tablet, Take 1,000 Units by mouth Daily. (Patient not taking: Reported on 12/1/2023), Disp: , Rfl:     cyclobenzaprine (FLEXERIL) 5 MG tablet, Take 5 mg by mouth 3 (Three) Times a Day. (Patient not taking: Reported on 12/1/2023), Disp: , Rfl:     ferrous sulfate (IRON SUPPLEMENT) 325 (65 FE) MG tablet, Take 1 tablet by mouth Daily With Breakfast. (Patient not taking: Reported on 12/1/2023), Disp: 30 tablet, Rfl: 2    fluticasone (FLONASE) 50 MCG/ACT nasal spray, 2 sprays into the nostril(s) as directed by provider Daily., Disp: 18.2 mL, Rfl: 1    omeprazole (PRILOSEC) 20 MG capsule, Take 1 capsule by mouth Daily. (Patient not taking: Reported on 12/1/2023), Disp: 30 capsule, Rfl: 0    pregabalin (LYRICA) 75 MG capsule, As needed (Patient not taking: Reported on 12/1/2023), Disp: , Rfl:     vitamin B-12 (CYANOCOBALAMIN) 1000 MCG tablet, Take 1 tablet by mouth Daily. (Patient not taking: Reported on 12/1/2023), Disp: 30 tablet, Rfl: 2    Allergies:   Allergies   Allergen Reactions    Hydrocodone Itching       Objective     Vital Signs:  "  Vitals:    12/01/23 1350   BP: 132/90   Pulse: 92   SpO2: 97%   Weight: 117 kg (258 lb)   Height: 175.3 cm (69\")     Body mass index is 38.1 kg/m².          Physical Exam:   Physical Exam  Vitals and nursing note reviewed.   Constitutional:       Appearance: Normal appearance.   HENT:      Head: Normocephalic and atraumatic.      Right Ear: Ear canal normal. A middle ear effusion is present. Tympanic membrane is erythematous.      Left Ear: Ear canal normal. A middle ear effusion is present.      Nose: No congestion or rhinorrhea.      Mouth/Throat:      Mouth: Mucous membranes are moist.      Pharynx: Oropharynx is clear. No posterior oropharyngeal erythema.   Cardiovascular:      Rate and Rhythm: Normal rate and regular rhythm.   Pulmonary:      Effort: Pulmonary effort is normal.      Breath sounds: Normal breath sounds. No decreased breath sounds, wheezing, rhonchi or rales.   Musculoskeletal:      Cervical back: Neck supple.      Right lower leg: No edema.      Left lower leg: No edema.   Lymphadenopathy:      Cervical: No cervical adenopathy.   Neurological:      Mental Status: He is alert.          Procedures     Assessment / Plan      Assessment/Plan:   Diagnoses and all orders for this visit:    1. Non-recurrent acute suppurative otitis media of right ear without spontaneous rupture of tympanic membrane (Primary)  -     amoxicillin-clavulanate (AUGMENTIN) 875-125 MG per tablet; Take 1 tablet by mouth 2 (Two) Times a Day.  Dispense: 20 tablet; Refill: 0    2. Eustachian tube disorder, bilateral  -     Chlorcyclizine-Pseudoephed (Stahist AD) 25-60 MG tablet; Take 1 tablet by mouth 2 (Two) Times a Day.  Dispense: 30 tablet; Refill: 0  -     fluticasone (FLONASE) 50 MCG/ACT nasal spray; 2 sprays into the nostril(s) as directed by provider Daily.  Dispense: 18.2 mL; Refill: 1         1. Otitis media.  Augmentin as directed for otitis media. Stahist AD and Flonase as directed for the eustachian tube disorder. " If he has any problems or questions, he is to let me know.      Follow Up:   No follow-ups on file.    Maria Teresa Mims PA-C   [facility]             Transcribed from ambient dictation for Maria Teresa Mims PA-C by Tomasa Nick.  12/01/23   15:09 EST    Patient or patient representative verbalized consent to the visit recording.  I have personally performed the services described in this document as transcribed by the above individual, and it is both accurate and complete.